# Patient Record
Sex: FEMALE | Race: WHITE | Employment: FULL TIME | URBAN - METROPOLITAN AREA
[De-identification: names, ages, dates, MRNs, and addresses within clinical notes are randomized per-mention and may not be internally consistent; named-entity substitution may affect disease eponyms.]

---

## 2018-08-16 ENCOUNTER — APPOINTMENT (OUTPATIENT)
Dept: ULTRASOUND IMAGING | Age: 33
End: 2018-08-16
Payer: MEDICAID

## 2018-08-16 ENCOUNTER — HOSPITAL ENCOUNTER (EMERGENCY)
Age: 33
Discharge: HOME | End: 2018-08-16
Payer: MEDICAID

## 2018-08-16 VITALS
BODY MASS INDEX: 41.95 KG/M2 | HEIGHT: 70 IN | HEART RATE: 90 BPM | WEIGHT: 293 LBS | OXYGEN SATURATION: 97 % | SYSTOLIC BLOOD PRESSURE: 145 MMHG | RESPIRATION RATE: 14 BRPM | TEMPERATURE: 98.9 F | DIASTOLIC BLOOD PRESSURE: 94 MMHG

## 2018-08-16 DIAGNOSIS — M79.89 LEG SWELLING: Primary | ICD-10-CM

## 2018-08-16 PROCEDURE — 99283 EMERGENCY DEPT VISIT LOW MDM: CPT

## 2018-08-16 PROCEDURE — 93971 EXTREMITY STUDY: CPT

## 2018-08-16 RX ORDER — NAPROXEN 500 MG/1
500 TABLET ORAL 2 TIMES DAILY
Qty: 30 TABLET | Refills: 0 | Status: SHIPPED | OUTPATIENT
Start: 2018-08-16 | End: 2020-08-13

## 2018-08-16 RX ORDER — LISINOPRIL 10 MG/1
10 TABLET ORAL DAILY
COMMUNITY
End: 2019-11-11 | Stop reason: SDUPTHER

## 2018-08-16 RX ORDER — METOPROLOL TARTRATE 50 MG/1
50 TABLET, FILM COATED ORAL 2 TIMES DAILY
COMMUNITY
End: 2019-11-11 | Stop reason: SDUPTHER

## 2018-08-16 ASSESSMENT — PAIN SCALES - GENERAL: PAINLEVEL_OUTOF10: 1

## 2018-08-16 NOTE — ED PROVIDER NOTES
Independent MLP    HPI:  8/16/18,   Time: 1:23 PM         Zeke Brooks is a 29 y.o. female presenting to the ED for pain and swelling left lower leg, beginning 2 days ago. The complaint has been persistent, mild in severity, and worsened by walking. Patient states she's been going up and down the steps quite a bit doing some extra activity. She's noted some increased pain deep in her left lower leg. She states that she has a history varicose veins. She has been sleeping on the couch which is different for her as well. There was no fall or trauma. Patient feels like the lower leg is a little bit more swollen than usual. All of the pain is below her knee. She reports that isn't deep and aching type of pain. Reports the pain is a little bit worse with ambulation. No redness or rash reported. Denies prior history of DVT. Denies chest pain or shortness of breath. ROS:     Constitutional: Negative for fever and chills  HENT: Negative for ear pain, sore throat and sinus pressure  Eyes: Negative for pain, discharge and redness  Respiratory:  Negative for shortness of breath, cough and wheezing  Cardiovascular: Negative for CP, edema or palpitations  Gastrointestinal: Negative for nausea, vomiting, diarrhea and abdominal distention  Genitourinary: Negative for dysuria and frequency  Musculoskeletal:  See HPI  Skin: Negative for rash and wound  Neurological: Negative for weakness and headaches  Hematological: Negative for adenopathy    All other systems reviewed and are negative      --------------------------------------------- PAST HISTORY ---------------------------------------------  Past Medical History:  has no past medical history on file. Past Surgical History:  has no past surgical history on file. Social History:      Family History: family history is not on file. The patients home medications have been reviewed.     Allergies: Patient has no known from these. She could also just be overdoing it with the stairs and moving. I'm recommending anti-inflammatories for now. Follow up with her PCP if any persistent or ongoing symptoms. She is aware and agreeable to this plan    Counseling: The emergency provider has spoken with the patient and family member patient and mother and discussed todays results, in addition to providing specific details for the plan of care and counseling regarding the diagnosis and prognosis. Questions are answered at this time and they are agreeable with the plan.      --------------------------------- IMPRESSION AND DISPOSITION ---------------------------------    IMPRESSION  1.  Leg swelling        DISPOSITION  Disposition: Discharge to home  Patient condition is stable                   Hiro Dent PA-C  08/16/18 1530

## 2019-04-30 VITALS
HEART RATE: 91 BPM | BODY MASS INDEX: 41.95 KG/M2 | SYSTOLIC BLOOD PRESSURE: 126 MMHG | HEIGHT: 70 IN | WEIGHT: 293 LBS | DIASTOLIC BLOOD PRESSURE: 70 MMHG | OXYGEN SATURATION: 99 %

## 2019-04-30 RX ORDER — ALPRAZOLAM 0.25 MG/1
0.25 TABLET ORAL NIGHTLY PRN
COMMUNITY
End: 2020-11-18 | Stop reason: SDUPTHER

## 2019-04-30 RX ORDER — MEDROXYPROGESTERONE ACETATE 150 MG/ML
150 INJECTION, SUSPENSION INTRAMUSCULAR
COMMUNITY

## 2019-04-30 RX ORDER — PAROXETINE 10 MG/1
10 TABLET, FILM COATED ORAL EVERY MORNING
COMMUNITY
End: 2019-11-11

## 2019-05-13 PROBLEM — F34.1 DYSTHYMIA: Status: ACTIVE | Noted: 2019-05-13

## 2019-05-13 PROBLEM — I10 ESSENTIAL HYPERTENSION: Status: ACTIVE | Noted: 2019-05-13

## 2019-10-23 RX ORDER — LISINOPRIL 10 MG/1
TABLET ORAL
Qty: 30 TABLET | Refills: 0 | OUTPATIENT
Start: 2019-10-23

## 2019-11-01 ENCOUNTER — TELEPHONE (OUTPATIENT)
Dept: PRIMARY CARE CLINIC | Age: 34
End: 2019-11-01

## 2019-11-01 RX ORDER — LISINOPRIL 10 MG/1
10 TABLET ORAL DAILY
Qty: 30 TABLET | Refills: 3 | OUTPATIENT
Start: 2019-11-01

## 2019-11-11 ENCOUNTER — HOSPITAL ENCOUNTER (OUTPATIENT)
Age: 34
Discharge: HOME | End: 2019-11-13
Payer: MEDICAID

## 2019-11-11 ENCOUNTER — OFFICE VISIT (OUTPATIENT)
Dept: PRIMARY CARE CLINIC | Age: 34
End: 2019-11-11
Payer: MEDICAID

## 2019-11-11 VITALS
SYSTOLIC BLOOD PRESSURE: 130 MMHG | HEIGHT: 70 IN | BODY MASS INDEX: 41.95 KG/M2 | WEIGHT: 293 LBS | DIASTOLIC BLOOD PRESSURE: 88 MMHG | TEMPERATURE: 78 F | OXYGEN SATURATION: 96 %

## 2019-11-11 DIAGNOSIS — I10 ESSENTIAL HYPERTENSION: Primary | ICD-10-CM

## 2019-11-11 DIAGNOSIS — E66.09 OBESITY DUE TO EXCESS CALORIES, UNSPECIFIED CLASSIFICATION, UNSPECIFIED WHETHER SERIOUS COMORBIDITY PRESENT: ICD-10-CM

## 2019-11-11 DIAGNOSIS — I10 ESSENTIAL HYPERTENSION: ICD-10-CM

## 2019-11-11 DIAGNOSIS — Z23 NEED FOR INFLUENZA VACCINATION: ICD-10-CM

## 2019-11-11 PROCEDURE — 36415 COLL VENOUS BLD VENIPUNCTURE: CPT

## 2019-11-11 PROCEDURE — G8482 FLU IMMUNIZE ORDER/ADMIN: HCPCS | Performed by: INTERNAL MEDICINE

## 2019-11-11 PROCEDURE — 1036F TOBACCO NON-USER: CPT | Performed by: INTERNAL MEDICINE

## 2019-11-11 PROCEDURE — G8427 DOCREV CUR MEDS BY ELIG CLIN: HCPCS | Performed by: INTERNAL MEDICINE

## 2019-11-11 PROCEDURE — 90471 IMMUNIZATION ADMIN: CPT | Performed by: INTERNAL MEDICINE

## 2019-11-11 PROCEDURE — G8417 CALC BMI ABV UP PARAM F/U: HCPCS | Performed by: INTERNAL MEDICINE

## 2019-11-11 PROCEDURE — 80053 COMPREHEN METABOLIC PANEL: CPT

## 2019-11-11 PROCEDURE — 99213 OFFICE O/P EST LOW 20 MIN: CPT | Performed by: INTERNAL MEDICINE

## 2019-11-11 PROCEDURE — 90686 IIV4 VACC NO PRSV 0.5 ML IM: CPT | Performed by: INTERNAL MEDICINE

## 2019-11-11 RX ORDER — GABAPENTIN 300 MG/1
CAPSULE ORAL
Refills: 1 | COMMUNITY
Start: 2019-11-07 | End: 2020-11-18 | Stop reason: SDUPTHER

## 2019-11-11 RX ORDER — ESCITALOPRAM OXALATE 10 MG/1
TABLET ORAL
Refills: 1 | COMMUNITY
Start: 2019-11-07 | End: 2020-11-18 | Stop reason: SDUPTHER

## 2019-11-11 RX ORDER — LISINOPRIL 10 MG/1
10 TABLET ORAL DAILY
Qty: 30 TABLET | Refills: 3 | Status: SHIPPED
Start: 2019-11-11 | End: 2020-04-03

## 2019-11-11 RX ORDER — METOPROLOL TARTRATE 50 MG/1
50 TABLET, FILM COATED ORAL 2 TIMES DAILY
Qty: 30 TABLET | Refills: 5 | Status: SHIPPED | OUTPATIENT
Start: 2019-11-11 | End: 2019-12-05

## 2019-11-11 ASSESSMENT — ENCOUNTER SYMPTOMS
TROUBLE SWALLOWING: 0
FACIAL SWELLING: 0
EYE ITCHING: 0
STRIDOR: 0
COUGH: 1
SHORTNESS OF BREATH: 0
RHINORRHEA: 0
COLOR CHANGE: 0
DIARRHEA: 0
PHOTOPHOBIA: 0
VOMITING: 0
SORE THROAT: 0
CONSTIPATION: 0
WHEEZING: 0
EYE DISCHARGE: 0
ANAL BLEEDING: 0
NAUSEA: 0
EYE PAIN: 0
BLOOD IN STOOL: 0
ABDOMINAL PAIN: 0

## 2019-11-12 LAB
ALBUMIN SERPL-MCNC: 4.2 G/DL (ref 3.5–5.2)
ALP BLD-CCNC: 58 U/L (ref 35–104)
ALT SERPL-CCNC: 30 U/L (ref 0–32)
ANION GAP SERPL CALCULATED.3IONS-SCNC: 17 MMOL/L (ref 7–16)
AST SERPL-CCNC: 24 U/L (ref 0–31)
BILIRUB SERPL-MCNC: 0.2 MG/DL (ref 0–1.2)
BUN BLDV-MCNC: 17 MG/DL (ref 6–20)
CALCIUM SERPL-MCNC: 9.5 MG/DL (ref 8.6–10.2)
CHLORIDE BLD-SCNC: 102 MMOL/L (ref 98–107)
CO2: 22 MMOL/L (ref 22–29)
CREAT SERPL-MCNC: 0.7 MG/DL (ref 0.5–1)
GFR AFRICAN AMERICAN: >60
GFR NON-AFRICAN AMERICAN: >60 ML/MIN/1.73
GLUCOSE BLD-MCNC: 89 MG/DL (ref 74–99)
POTASSIUM SERPL-SCNC: 4.6 MMOL/L (ref 3.5–5)
SODIUM BLD-SCNC: 141 MMOL/L (ref 132–146)
TOTAL PROTEIN: 7.8 G/DL (ref 6.4–8.3)

## 2019-12-05 DIAGNOSIS — I10 ESSENTIAL HYPERTENSION: ICD-10-CM

## 2019-12-05 RX ORDER — METOPROLOL TARTRATE 50 MG/1
50 TABLET, FILM COATED ORAL 2 TIMES DAILY
Qty: 30 TABLET | Refills: 5 | Status: SHIPPED
Start: 2019-12-05 | End: 2020-04-09

## 2019-12-11 PROBLEM — Z23 NEED FOR INFLUENZA VACCINATION: Status: RESOLVED | Noted: 2019-11-11 | Resolved: 2019-12-11

## 2020-04-03 RX ORDER — LISINOPRIL 10 MG/1
TABLET ORAL
Qty: 30 TABLET | Refills: 2 | Status: SHIPPED
Start: 2020-04-03 | End: 2020-05-11 | Stop reason: SDUPTHER

## 2020-04-03 NOTE — TELEPHONE ENCOUNTER
Last Appointment:  11/11/2019  Future Appointments   Date Time Provider Rolando Mason   5/11/2020  2:00 PM Jude Dawson

## 2020-04-09 RX ORDER — METOPROLOL TARTRATE 50 MG/1
75 TABLET, FILM COATED ORAL 2 TIMES DAILY
Qty: 135 TABLET | Refills: 1 | Status: SHIPPED
Start: 2020-04-09 | End: 2020-05-11 | Stop reason: SDUPTHER

## 2020-04-23 PROBLEM — K04.7 DENTAL ABSCESS: Status: ACTIVE | Noted: 2020-04-23

## 2020-04-23 RX ORDER — PENICILLIN V POTASSIUM 500 MG/1
500 TABLET ORAL 4 TIMES DAILY
Qty: 40 TABLET | Refills: 0 | Status: SHIPPED | OUTPATIENT
Start: 2020-04-23 | End: 2020-05-03

## 2020-05-11 ENCOUNTER — VIRTUAL VISIT (OUTPATIENT)
Dept: PRIMARY CARE CLINIC | Age: 35
End: 2020-05-11
Payer: MEDICAID

## 2020-05-11 PROBLEM — L65.9 ALOPECIA: Status: ACTIVE | Noted: 2020-05-11

## 2020-05-11 PROBLEM — K04.7 DENTAL ABSCESS: Status: RESOLVED | Noted: 2020-04-23 | Resolved: 2020-05-11

## 2020-05-11 PROBLEM — Z13.220 SCREENING FOR CHOLESTEROL LEVEL: Status: ACTIVE | Noted: 2020-05-11

## 2020-05-11 PROBLEM — L70.0 ACNE VULGARIS: Status: ACTIVE | Noted: 2020-05-11

## 2020-05-11 PROBLEM — F32.A DEPRESSION: Status: ACTIVE | Noted: 2019-05-13

## 2020-05-11 PROCEDURE — 99214 OFFICE O/P EST MOD 30 MIN: CPT | Performed by: INTERNAL MEDICINE

## 2020-05-11 RX ORDER — LISINOPRIL 10 MG/1
10 TABLET ORAL DAILY
Qty: 30 TABLET | Refills: 5 | Status: SHIPPED
Start: 2020-05-11 | End: 2020-08-13 | Stop reason: SDUPTHER

## 2020-05-11 RX ORDER — METOPROLOL TARTRATE 50 MG/1
75 TABLET, FILM COATED ORAL 2 TIMES DAILY
Qty: 135 TABLET | Refills: 1 | Status: SHIPPED
Start: 2020-05-11 | End: 2020-08-13 | Stop reason: SDUPTHER

## 2020-05-11 NOTE — PROGRESS NOTES
TELEHEALTH EVALUATION -- Audio/Visual (During EALIJ-14 public health emergency)    HPI:    Pawel Coughlin (:  1985) has requested an audio/video evaluation for the following concern(s): Hypertension    Patient has a history of hypertension, morbid obesity, depression, she sees a psychiatrist for her depression. Biggest complaint right now is acne. She says that she has been breaking out all over her face. She is on Depo-Provera wonder if this might be contributing to it. She sees her gynecologist and has a Pap scheduled on May 22. She says she has not had any luck in trying to lose weight. She is tried diet and exercise. She also complains of hair loss mainly on her right temple. Review of Systems   Constitutional: Negative for appetite change, fatigue and unexpected weight change. HENT: Negative for congestion, ear pain, facial swelling, rhinorrhea, sore throat, tinnitus and trouble swallowing. Right temporal hair loss   Eyes: Negative for photophobia, pain, discharge, itching and visual disturbance. Respiratory: Negative for cough, shortness of breath, wheezing and stridor. Cardiovascular: Negative for chest pain, palpitations and leg swelling. Gastrointestinal: Negative for abdominal pain, anal bleeding, blood in stool, constipation, diarrhea, nausea and vomiting. Endocrine: Negative for cold intolerance, heat intolerance, polydipsia, polyphagia and polyuria. Genitourinary: Negative for difficulty urinating, dysuria, flank pain, frequency, hematuria and urgency. Musculoskeletal: Negative for arthralgias, gait problem, joint swelling and myalgias. Skin: Negative for color change, pallor and rash. Acne   Allergic/Immunologic: Negative for environmental allergies and food allergies. Neurological: Negative for dizziness, tremors, seizures, syncope, speech difficulty, weakness, light-headedness, numbness and headaches. Hematological: Negative for adenopathy. Does not bruise/bleed easily. Psychiatric/Behavioral: Negative for agitation, behavioral problems, confusion, sleep disturbance and suicidal ideas. The patient is not nervous/anxious. Prior to Visit Medications    Medication Sig Taking? Authorizing Provider   metoprolol tartrate (LOPRESSOR) 50 MG tablet Take 1.5 tablets by mouth 2 times daily Yes Sarita Villa,    lisinopril (PRINIVIL;ZESTRIL) 10 MG tablet Take 1 tablet by mouth daily Yes Sarita Villa DO   escitalopram (LEXAPRO) 10 MG tablet TK 1 T PO QD Yes Historical Provider, MD   gabapentin (NEURONTIN) 300 MG capsule TK 1 C PO QHS Yes Historical Provider, MD   ALPRAZolam (XANAX) 0.25 MG tablet Take 0.25 mg by mouth nightly as needed for Sleep. Yes Historical Provider, MD   medroxyPROGESTERone (DEPO-PROVERA) 150 MG/ML injection Inject 150 mg into the muscle every 3 months Yes Historical Provider, MD   naproxen (NAPROSYN) 500 MG tablet Take 1 tablet by mouth 2 times daily for 30 doses Yes Zackary Harrington PA-C       Social History     Tobacco Use    Smoking status: Never Smoker    Smokeless tobacco: Never Used   Substance Use Topics    Alcohol use: Not on file    Drug use: Not on file            PHYSICAL EXAMINATION:  [ INSTRUCTIONS:  \"[x]\" Indicates a positive item  \"[]\" Indicates a negative item  -- DELETE ALL ITEMS NOT EXAMINED]    Constitutional: [x] Appears well-developed and well-nourished [] No apparent distress      [x] Abnormal- obese  Mental status  [x] Alert and awake  [x] Oriented to person/place/time [x]Able to follow commands      Eyes:  EOM    [x]  Normal  [] Abnormal-  Sclera  [x]  Normal  [] Abnormal -         Discharge [x]  None visible  [] Abnormal -    HENT:   [x] Normocephalic, atraumatic.   [] Abnormal   [x] Mouth/Throat: Mucous membranes are moist.     External Ears [x] Normal  [] Abnormal-     Neck: [x] No visualized mass     Pulmonary/Chest: [x] Respiratory effort normal.  [x] No visualized signs of difficulty of a physical exam that could be done are the elements supported by direct observation. 2. I would evaluate the patient and recommend diagnostics and treatments based on my assessment. 3. If it was felt that the patient should be evaluated in clinic or an emergency room setting, then they would be directed there. 4. Our sessions are not being recorded and that personal health information is protected. 5. Our team would provide follow up care in person if/when the patient needs it. Patient does agree to proceed with telemedicine consultation. Patient's location: home address in [facility]  Physician  location other address in [facility] other people involved in call none        Time spent: Greater than Not billed by time    This visit was completed virtually using [email or web address]. me    --Ren Valerio DO on 5/12/2020 at 9:17 AM    An electronic signature was used to authenticate this note.

## 2020-05-11 NOTE — PATIENT INSTRUCTIONS
Return within the month for fasting blood work  Call the office with the name of a dermatologist that you can see and I will make the referral

## 2020-05-12 ASSESSMENT — ENCOUNTER SYMPTOMS
SHORTNESS OF BREATH: 0
EYE DISCHARGE: 0
TROUBLE SWALLOWING: 0
COUGH: 0
NAUSEA: 0
CONSTIPATION: 0
PHOTOPHOBIA: 0
ROS SKIN COMMENTS: ACNE
WHEEZING: 0
RHINORRHEA: 0
DIARRHEA: 0
ABDOMINAL PAIN: 0
ANAL BLEEDING: 0
VOMITING: 0
FACIAL SWELLING: 0
STRIDOR: 0
COLOR CHANGE: 0
EYE ITCHING: 0
BLOOD IN STOOL: 0
EYE PAIN: 0
SORE THROAT: 0

## 2020-06-08 ENCOUNTER — TELEPHONE (OUTPATIENT)
Dept: PRIMARY CARE CLINIC | Age: 35
End: 2020-06-08

## 2020-06-10 PROBLEM — Z13.220 SCREENING FOR CHOLESTEROL LEVEL: Status: RESOLVED | Noted: 2020-05-11 | Resolved: 2020-06-10

## 2020-06-23 ENCOUNTER — TELEPHONE (OUTPATIENT)
Dept: PRIMARY CARE CLINIC | Age: 35
End: 2020-06-23

## 2020-08-13 ENCOUNTER — OFFICE VISIT (OUTPATIENT)
Dept: PRIMARY CARE CLINIC | Age: 35
End: 2020-08-13
Payer: MEDICAID

## 2020-08-13 VITALS
DIASTOLIC BLOOD PRESSURE: 76 MMHG | TEMPERATURE: 97.2 F | BODY MASS INDEX: 41.95 KG/M2 | WEIGHT: 293 LBS | OXYGEN SATURATION: 97 % | SYSTOLIC BLOOD PRESSURE: 128 MMHG | HEIGHT: 70 IN | HEART RATE: 86 BPM

## 2020-08-13 PROBLEM — E01.0 THYROMEGALY: Status: ACTIVE | Noted: 2020-08-13

## 2020-08-13 PROBLEM — E78.49 OTHER HYPERLIPIDEMIA: Status: ACTIVE | Noted: 2020-08-13

## 2020-08-13 PROCEDURE — 1036F TOBACCO NON-USER: CPT | Performed by: INTERNAL MEDICINE

## 2020-08-13 PROCEDURE — 99213 OFFICE O/P EST LOW 20 MIN: CPT | Performed by: INTERNAL MEDICINE

## 2020-08-13 PROCEDURE — G8417 CALC BMI ABV UP PARAM F/U: HCPCS | Performed by: INTERNAL MEDICINE

## 2020-08-13 PROCEDURE — G8427 DOCREV CUR MEDS BY ELIG CLIN: HCPCS | Performed by: INTERNAL MEDICINE

## 2020-08-13 RX ORDER — DOXYCYCLINE HYCLATE 100 MG/1
100 CAPSULE ORAL 2 TIMES DAILY
COMMUNITY
End: 2020-12-16

## 2020-08-13 RX ORDER — LISINOPRIL 10 MG/1
10 TABLET ORAL DAILY
Qty: 30 TABLET | Refills: 5 | Status: SHIPPED
Start: 2020-08-13 | End: 2020-11-12 | Stop reason: SDUPTHER

## 2020-08-13 RX ORDER — METOPROLOL TARTRATE 50 MG/1
75 TABLET, FILM COATED ORAL 2 TIMES DAILY
Qty: 135 TABLET | Refills: 1 | Status: SHIPPED
Start: 2020-08-13 | End: 2020-11-12 | Stop reason: SDUPTHER

## 2020-08-13 ASSESSMENT — ENCOUNTER SYMPTOMS
SORE THROAT: 0
COLOR CHANGE: 0
EYE PAIN: 0
PHOTOPHOBIA: 0
SHORTNESS OF BREATH: 0
WHEEZING: 0
EYE DISCHARGE: 0
BLOOD IN STOOL: 0
FACIAL SWELLING: 0
DIARRHEA: 0
ANAL BLEEDING: 0
CONSTIPATION: 0
STRIDOR: 0
ABDOMINAL PAIN: 0
EYE ITCHING: 0
RHINORRHEA: 0
TROUBLE SWALLOWING: 0
VOMITING: 0
COUGH: 0
NAUSEA: 0

## 2020-08-13 NOTE — PROGRESS NOTES
2020    Name: Chantal Jasmine : 1985 Sex: female  Age: 30 y.o. Subjective:  Chief Complaint   Patient presents with    Hypertension        Hypertension   Pertinent negatives include no chest pain, headaches, palpitations or shortness of breath. She  has a history of hypertension, depression and anxiety. She is on metoprolol 50 mg twice daily and lisinopril 10 mg daily. Does not check her blood pressures at home. She sees Dr. Darlin Jimenez for depression, anxiety and restless arms. He has her on gabapentin escitalopram and alprazolam.      She gets a Depo-Provera injections from her gynecologist Dr. Rene Hall in [place]. She sees him for her Paps and pelvics. She is morbidly obese    She recently saw Dr Harmony Bryson for her acne. He started her on doxycycline 100 mg twice a day. .. Review of Systems   Constitutional: Negative for appetite change, fatigue and unexpected weight change. HENT: Negative for congestion, ear pain, facial swelling, rhinorrhea, sore throat, tinnitus and trouble swallowing. Eyes: Negative for photophobia, pain, discharge, itching and visual disturbance. Respiratory: Negative for cough, shortness of breath, wheezing and stridor. Cardiovascular: Negative for chest pain, palpitations and leg swelling. Gastrointestinal: Negative for abdominal pain, anal bleeding, blood in stool, constipation, diarrhea, nausea and vomiting. Endocrine: Negative for cold intolerance, heat intolerance, polydipsia, polyphagia and polyuria. Genitourinary: Negative for difficulty urinating, dysuria, flank pain, frequency, hematuria and urgency. Musculoskeletal: Negative for arthralgias, gait problem, joint swelling and myalgias. Skin: Negative for color change, pallor and rash. Allergic/Immunologic: Negative for environmental allergies and food allergies.    Neurological: Negative for dizziness, tremors, seizures, syncope, speech difficulty, weakness, light-headedness, numbness and headaches. Hematological: Negative for adenopathy. Does not bruise/bleed easily. Psychiatric/Behavioral: Negative for agitation, behavioral problems, confusion, sleep disturbance and suicidal ideas. The patient is not nervous/anxious.            Current Outpatient Medications:     metoprolol tartrate (LOPRESSOR) 50 MG tablet, Take 1.5 tablets by mouth 2 times daily, Disp: 135 tablet, Rfl: 1    lisinopril (PRINIVIL;ZESTRIL) 10 MG tablet, Take 1 tablet by mouth daily, Disp: 30 tablet, Rfl: 5    doxycycline hyclate (VIBRAMYCIN) 100 MG capsule, Take 100 mg by mouth 2 times daily, Disp: , Rfl:     escitalopram (LEXAPRO) 10 MG tablet, TK 1 T PO QD, Disp: , Rfl: 1    gabapentin (NEURONTIN) 300 MG capsule, TK 1 C PO QHS, Disp: , Rfl: 1    ALPRAZolam (XANAX) 0.25 MG tablet, Take 0.25 mg by mouth nightly as needed for Sleep., Disp: , Rfl:     medroxyPROGESTERone (DEPO-PROVERA) 150 MG/ML injection, Inject 150 mg into the muscle every 3 months, Disp: , Rfl:      Allergies   Allergen Reactions    No Known Allergies         Past Medical History:   Diagnosis Date    Dental abscess 4/23/2020       Health Maintenance Due   Topic Date Due    Varicella vaccine (1 of 2 - 2-dose childhood series) 11/24/1988    HIV screen  11/24/2002    DTaP/Tdap/Td vaccine (1 - Tdap) 11/24/2006    Cervical cancer screen  11/24/2008        Patient Active Problem List   Diagnosis    Essential hypertension    Depression    Morbid obesity (HCC)    Acne vulgaris    Alopecia    Thyromegaly    Other hyperlipidemia        Past Surgical History:   Procedure Laterality Date    TYMPANOSTOMY TUBE PLACEMENT          Family History   Problem Relation Age of Onset    High Blood Pressure Father     Diabetes Father         Social History     Tobacco Use    Smoking status: Never Smoker    Smokeless tobacco: Never Used   Substance Use Topics    Alcohol use: Not on file    Drug use: Not on file        Objective  Vitals:    08/13/20 1324 BP: 128/76   Site: Right Upper Arm   Position: Sitting   Cuff Size: Medium Adult   Pulse: 86   Temp: 97.2 °F (36.2 °C)   TempSrc: Temporal   SpO2: 97%   Weight: (!) 353 lb (160.1 kg)   Height: 5' 10\" (1.778 m)        Exam:  Physical Exam  Vitals signs reviewed. Constitutional:       General: She is not in acute distress. Appearance: She is well-developed. She is obese. She is not ill-appearing. HENT:      Head: Normocephalic. Right Ear: External ear normal.      Left Ear: External ear normal.   Eyes:      General: No scleral icterus. Right eye: No discharge. Left eye: No discharge. Conjunctiva/sclera: Conjunctivae normal.      Pupils: Pupils are equal, round, and reactive to light. Neck:      Musculoskeletal: Normal range of motion and neck supple. Thyroid: No thyromegaly. Comments: Thyroid slightly enlarged  Cardiovascular:      Rate and Rhythm: Normal rate and regular rhythm. Heart sounds: Normal heart sounds. No murmur. No friction rub. No gallop. Pulmonary:      Effort: Pulmonary effort is normal. No respiratory distress. Breath sounds: Normal breath sounds. No wheezing or rales. Chest:      Chest wall: No tenderness. Abdominal:      General: Bowel sounds are normal. There is no distension. Palpations: Abdomen is soft. There is no mass. Tenderness: There is no abdominal tenderness. There is no guarding or rebound. Musculoskeletal: Normal range of motion. General: No tenderness or deformity. Lymphadenopathy:      Cervical: No cervical adenopathy. Skin:     General: Skin is warm and dry. Coloration: Skin is not pale. Findings: No erythema or rash. Comments: Few acneiform lesions on her face   Neurological:      Mental Status: She is alert and oriented to person, place, and time. Cranial Nerves: No cranial nerve deficit. Sensory: No sensory deficit.       Deep Tendon Reflexes: Reflexes normal. Psychiatric:         Mood and Affect: Mood normal.         Behavior: Behavior normal.         Thought Content: Thought content normal.         Judgment: Judgment normal.          Last labs reviewed. ASSESSMENT & PLAN :   Problem List        Circulatory    Essential hypertension - Primary     Blood pressures are stable. Continue medications and monitor blood pressures at home. Call office if systolics are over 133 and diastolics over 90. Relevant Medications    metoprolol tartrate (LOPRESSOR) 50 MG tablet    lisinopril (PRINIVIL;ZESTRIL) 10 MG tablet    Other Relevant Orders    Comprehensive Metabolic Panel       Endocrine    Thyromegaly     Check TSH         Relevant Orders    TSH without Reflex       Musculoskeletal and Integument    Acne vulgaris     Continue doxycycline and follow-up with her dermatologist         Relevant Medications    doxycycline hyclate (VIBRAMYCIN) 100 MG capsule       Other    Depression     Continue Lexapro, gabapentin and alprazolam per her psychiatrist         Relevant Medications    ALPRAZolam (XANAX) 0.25 MG tablet    escitalopram (LEXAPRO) 10 MG tablet    Morbid obesity ([address]     Encourage patient to try to lose some weight         Other hyperlipidemia     Return for fasting lipid profile         Relevant Medications    metoprolol tartrate (LOPRESSOR) 50 MG tablet    lisinopril (PRINIVIL;ZESTRIL) 10 MG tablet           Return in about 4 months (around 12/13/2020).        Sujit Yesenia, DO  8/13/2020

## 2020-11-04 ENCOUNTER — TELEPHONE (OUTPATIENT)
Dept: PRIMARY CARE CLINIC | Age: 35
End: 2020-11-04

## 2020-11-04 NOTE — TELEPHONE ENCOUNTER
Called patient at her request.  She has been having problems with maxillary pain, she put on some yellow-green mucus. She is fatigued does not feel good. Low-grade fever, cough productive of yellowish phlegm. No chills or diaphoresis. No allergies non-smoker. Medications reviewed, no changes  Past medical family social and surgical history reviewed without any changes  Systems review negative except for above    Assessment: Acute sinusitis    Plan:  Augmentin 875 twice daily sent to [facility] take for 10 days. Take a probiotic while on antibiotic.   If no better see me next week

## 2020-11-12 RX ORDER — LISINOPRIL 10 MG/1
10 TABLET ORAL DAILY
Qty: 30 TABLET | Refills: 5 | Status: SHIPPED
Start: 2020-11-12 | End: 2021-06-23 | Stop reason: SDUPTHER

## 2020-11-12 RX ORDER — METOPROLOL TARTRATE 50 MG/1
75 TABLET, FILM COATED ORAL 2 TIMES DAILY
Qty: 135 TABLET | Refills: 1 | Status: SHIPPED
Start: 2020-11-12 | End: 2020-12-16 | Stop reason: SDUPTHER

## 2020-11-18 RX ORDER — ESCITALOPRAM OXALATE 10 MG/1
TABLET ORAL
Qty: 30 TABLET | Refills: 0 | Status: SHIPPED
Start: 2020-11-18 | End: 2020-12-14

## 2020-11-18 RX ORDER — GABAPENTIN 300 MG/1
CAPSULE ORAL
Qty: 60 CAPSULE | Refills: 0 | Status: SHIPPED
Start: 2020-11-18 | End: 2020-12-14

## 2020-11-18 RX ORDER — ALPRAZOLAM 0.25 MG/1
0.25 TABLET ORAL NIGHTLY PRN
Qty: 30 TABLET | Refills: 0 | Status: SHIPPED
Start: 2020-11-18 | End: 2020-12-16 | Stop reason: SDUPTHER

## 2020-11-18 NOTE — PROGRESS NOTES
Received a call from patient that she is transferring to another physician but unable to see her till January 17, 2021. She requests that I order prescriptions for her till that date.   She needs prescriptions for gabapentin, Lexapro and alprazolam.  It looks like these were all ordered by her psychiatrist.  Gabapentin last prescription was 60 pills on 10/18/2020, alprazolam 0.25 is 30 pills on 9/16/2020

## 2020-12-14 ENCOUNTER — OFFICE VISIT (OUTPATIENT)
Dept: FAMILY MEDICINE CLINIC | Age: 35
End: 2020-12-14
Payer: MEDICAID

## 2020-12-14 VITALS
BODY MASS INDEX: 50.65 KG/M2 | WEIGHT: 293 LBS | SYSTOLIC BLOOD PRESSURE: 115 MMHG | HEART RATE: 91 BPM | DIASTOLIC BLOOD PRESSURE: 70 MMHG | TEMPERATURE: 97.4 F | OXYGEN SATURATION: 98 %

## 2020-12-14 PROCEDURE — G8417 CALC BMI ABV UP PARAM F/U: HCPCS | Performed by: PHYSICIAN ASSISTANT

## 2020-12-14 PROCEDURE — G8484 FLU IMMUNIZE NO ADMIN: HCPCS | Performed by: PHYSICIAN ASSISTANT

## 2020-12-14 PROCEDURE — 1036F TOBACCO NON-USER: CPT | Performed by: PHYSICIAN ASSISTANT

## 2020-12-14 PROCEDURE — G8427 DOCREV CUR MEDS BY ELIG CLIN: HCPCS | Performed by: PHYSICIAN ASSISTANT

## 2020-12-14 PROCEDURE — 99213 OFFICE O/P EST LOW 20 MIN: CPT | Performed by: PHYSICIAN ASSISTANT

## 2020-12-14 RX ORDER — ESCITALOPRAM OXALATE 10 MG/1
TABLET ORAL
Qty: 30 TABLET | Refills: 0 | Status: SHIPPED
Start: 2020-12-14 | End: 2021-01-11

## 2020-12-14 RX ORDER — CEFDINIR 300 MG/1
300 CAPSULE ORAL 2 TIMES DAILY
Qty: 20 CAPSULE | Refills: 0 | Status: SHIPPED | OUTPATIENT
Start: 2020-12-14 | End: 2020-12-24

## 2020-12-14 RX ORDER — GABAPENTIN 300 MG/1
CAPSULE ORAL
Qty: 60 CAPSULE | Refills: 0 | Status: SHIPPED
Start: 2020-12-14 | End: 2021-01-11

## 2020-12-14 RX ORDER — FLUCONAZOLE 150 MG/1
150 TABLET ORAL ONCE
Qty: 1 TABLET | Refills: 0 | Status: SHIPPED | OUTPATIENT
Start: 2020-12-14 | End: 2020-12-14

## 2020-12-14 NOTE — PROGRESS NOTES
Weight: (!) 353 lb (160.1 kg)        Exam:  Const: Appears healthy and well developed. Vital reviewed as per triage. No acute distress. Head/Face: Normocephalic, atraumatic. Facies is symmetric. Eyes: Pupils equal, round and reactive to light. ENMT: Left external canal is without inflammation or occlusion. Right external canal is without inflammation or occlusion. No pain with movement of auricles. Left tympanic membrane is pearly gray with good light reflex. Right tympanic membrane is erythematous. No mastoid tenderness noted bilaterally. Nares are patent. Buccal mucosa is moist.  No erythema in the posterior pharynx. Resp: Clear to auscultation bilaterally. CV: Rhythm is regular. S1 is normal. S2 is normal.  Lymph: No visible or palpable cervical lymphadenopathy. Submandibular nodes not palpable. No meningeal signs. Skin: Skin is warm and dry. Neuro: Alert and oriented x3. Speech is articulate and fluent. Psych: Mood and affect are appropriate to situation. Maylin Mack was seen today for otalgia. Diagnoses and all orders for this visit:    Right otitis media, unspecified otitis media type  -     cefdinir (OMNICEF) 300 MG capsule; Take 1 capsule by mouth 2 times daily for 10 days    Other orders  -     fluconazole (DIFLUCAN) 150 MG tablet; Take 1 tablet by mouth once for 1 dose        Patient is to follow-up with their PCP in the next 5-7 days. If there are any new or worsening symptoms to the emergency department.        Seen By:    Bob Moore PA-C

## 2020-12-16 ENCOUNTER — OFFICE VISIT (OUTPATIENT)
Dept: PRIMARY CARE CLINIC | Age: 35
End: 2020-12-16
Payer: MEDICAID

## 2020-12-16 VITALS
OXYGEN SATURATION: 97 % | TEMPERATURE: 97.2 F | DIASTOLIC BLOOD PRESSURE: 78 MMHG | BODY MASS INDEX: 50.65 KG/M2 | HEART RATE: 72 BPM | HEIGHT: 70 IN | SYSTOLIC BLOOD PRESSURE: 136 MMHG

## 2020-12-16 PROBLEM — F41.9 ANXIETY: Status: ACTIVE | Noted: 2020-12-16

## 2020-12-16 PROCEDURE — 99213 OFFICE O/P EST LOW 20 MIN: CPT | Performed by: INTERNAL MEDICINE

## 2020-12-16 PROCEDURE — G8417 CALC BMI ABV UP PARAM F/U: HCPCS | Performed by: INTERNAL MEDICINE

## 2020-12-16 PROCEDURE — G8427 DOCREV CUR MEDS BY ELIG CLIN: HCPCS | Performed by: INTERNAL MEDICINE

## 2020-12-16 PROCEDURE — 1036F TOBACCO NON-USER: CPT | Performed by: INTERNAL MEDICINE

## 2020-12-16 PROCEDURE — G8484 FLU IMMUNIZE NO ADMIN: HCPCS | Performed by: INTERNAL MEDICINE

## 2020-12-16 RX ORDER — ALPRAZOLAM 0.25 MG/1
0.25 TABLET ORAL NIGHTLY PRN
Qty: 30 TABLET | Refills: 0 | Status: SHIPPED | OUTPATIENT
Start: 2020-12-16 | End: 2021-01-15

## 2020-12-16 RX ORDER — METOPROLOL TARTRATE 50 MG/1
75 TABLET, FILM COATED ORAL 2 TIMES DAILY
Qty: 135 TABLET | Refills: 1 | Status: SHIPPED
Start: 2020-12-16 | End: 2021-05-14 | Stop reason: SDUPTHER

## 2020-12-16 ASSESSMENT — ENCOUNTER SYMPTOMS
DIARRHEA: 0
SORE THROAT: 0
EYE DISCHARGE: 0
EYE ITCHING: 0
SHORTNESS OF BREATH: 0
COUGH: 0
BLOOD IN STOOL: 0
CONSTIPATION: 0
FACIAL SWELLING: 0
PHOTOPHOBIA: 0
VOMITING: 0
COLOR CHANGE: 0
ANAL BLEEDING: 0
RHINORRHEA: 0
WHEEZING: 0
ROS SKIN COMMENTS: ACNE
ABDOMINAL PAIN: 0
TROUBLE SWALLOWING: 0
EYE PAIN: 0
NAUSEA: 0
STRIDOR: 0

## 2020-12-16 NOTE — PROGRESS NOTES
2020    Name: Xochitl Lee : 1985 Sex: female  Age: 37 y.o. Subjective:  Chief Complaint   Patient presents with    Hypertension        Hypertension  Pertinent negatives include no chest pain, headaches, palpitations or shortness of breath. She  has a history of hypertension, and anxiety. She is on metoprolol 50 mg twice daily and lisinopril 10 mg daily. Does not check her blood pressures at home. She will be seeing a new psychiatrist in [place] [address]. She requests a 1 month supply of alprazolam and escitalopram until she gets to see him in January. Reviewed her OARRS and her use is appropriate. We will go ahead and fill that for her for this month. She gets a Depo-Provera injections from her gynecologist Dr. Melissa Contreras in [place]. She sees him for her Paps and pelvics. She is morbidly obese    She recently saw Dr Amira Fowler for her acne. He started her on doxycycline 100 mg twice a day. ... She is off of this now. This did not really work so she is going back to see him soon. She was seen in [address] [place] [address] recently and is being treated for an earache with cefdinir. She will be coming in in the future for a flu shot and her fasting blood work    Review of Systems   Constitutional: Negative for appetite change, fatigue and unexpected weight change. HENT: Negative for congestion, ear pain, facial swelling, rhinorrhea, sore throat, tinnitus and trouble swallowing. Eyes: Negative for photophobia, pain, discharge, itching and visual disturbance. Respiratory: Negative for cough, shortness of breath, wheezing and stridor. Cardiovascular: Negative for chest pain, palpitations and leg swelling. Gastrointestinal: Negative for abdominal pain, anal bleeding, blood in stool, constipation, diarrhea, nausea and vomiting. Endocrine: Negative for cold intolerance, heat intolerance, polydipsia, polyphagia and polyuria.    Genitourinary: Negative for difficulty urinating, dysuria, flank pain, frequency, hematuria and urgency. Musculoskeletal: Negative for arthralgias, gait problem, joint swelling and myalgias. Skin: Negative for color change, pallor and rash. acne   Allergic/Immunologic: Negative for environmental allergies and food allergies. Neurological: Negative for dizziness, tremors, seizures, syncope, speech difficulty, weakness, light-headedness, numbness and headaches. Hematological: Negative for adenopathy. Does not bruise/bleed easily. Psychiatric/Behavioral: Negative for agitation, behavioral problems, confusion, sleep disturbance and suicidal ideas. The patient is not nervous/anxious. Current Outpatient Medications:     ALPRAZolam (XANAX) 0.25 MG tablet, Take 1 tablet by mouth nightly as needed for Sleep for up to 30 days. , Disp: 30 tablet, Rfl: 0    metoprolol tartrate (LOPRESSOR) 50 MG tablet, Take 1.5 tablets by mouth 2 times daily, Disp: 135 tablet, Rfl: 1    escitalopram (LEXAPRO) 10 MG tablet, TAKE 1 TABLET BY MOUTH DAILY, Disp: 30 tablet, Rfl: 0    gabapentin (NEURONTIN) 300 MG capsule, TAKE 1 CAPSULE BY MOUTH TWICE DAILY, Disp: 60 capsule, Rfl: 0    cefdinir (OMNICEF) 300 MG capsule, Take 1 capsule by mouth 2 times daily for 10 days, Disp: 20 capsule, Rfl: 0    medroxyPROGESTERone (DEPO-PROVERA) 150 MG/ML injection, Inject 150 mg into the muscle every 3 months, Disp: , Rfl:     lisinopril (PRINIVIL;ZESTRIL) 10 MG tablet, Take 1 tablet by mouth daily, Disp: 30 tablet, Rfl: 5     Allergies   Allergen Reactions    No Known Allergies         Past Medical History:   Diagnosis Date    Dental abscess 4/23/2020       Health Maintenance Due   Topic Date Due    Varicella vaccine (1 of 2 - 2-dose childhood series) 11/24/1988    HIV screen  11/24/2002    Cervical cancer screen  11/24/2008    Flu vaccine (1) 09/01/2020    Potassium monitoring  11/11/2020    Creatinine monitoring  11/11/2020        Patient Active Problem List   Diagnosis    Essential hypertension    Flu vaccine need    Acne vulgaris    Alopecia    Thyromegaly    Other hyperlipidemia    Anxiety        Past Surgical History:   Procedure Laterality Date    TYMPANOSTOMY TUBE PLACEMENT          Family History   Problem Relation Age of Onset    High Blood Pressure Father     Diabetes Father         Social History     Tobacco Use    Smoking status: Never Smoker    Smokeless tobacco: Never Used   Substance Use Topics    Alcohol use: Not on file    Drug use: Not on file        Objective  Vitals:    12/16/20 1353   BP: 136/78   Site: Right Upper Arm   Position: Sitting   Cuff Size: Medium Adult   Pulse: 72   Temp: 97.2 °F (36.2 °C)   TempSrc: Temporal   SpO2: 97%   Weight: Comment: refuse   Height: 5' 10\" (1.778 m)        Exam:  Physical Exam  Vitals signs reviewed. Constitutional:       General: She is not in acute distress. Appearance: She is well-developed. She is obese. She is not ill-appearing. HENT:      Head: Normocephalic. Right Ear: External ear normal.      Left Ear: External ear normal.   Eyes:      General: No scleral icterus. Right eye: No discharge. Left eye: No discharge. Conjunctiva/sclera: Conjunctivae normal.      Pupils: Pupils are equal, round, and reactive to light. Neck:      Musculoskeletal: Normal range of motion and neck supple. Thyroid: No thyromegaly. Comments: Thyroid slightly enlarged  Cardiovascular:      Rate and Rhythm: Normal rate and regular rhythm. Heart sounds: Normal heart sounds. No murmur. No friction rub. No gallop. Pulmonary:      Effort: Pulmonary effort is normal. No respiratory distress. Breath sounds: Normal breath sounds. No wheezing or rales. Chest:      Chest wall: No tenderness. Abdominal:      General: Bowel sounds are normal. There is no distension. Palpations: Abdomen is soft. There is no mass. Tenderness: There is no abdominal tenderness.  There is no guarding or rebound. Musculoskeletal: Normal range of motion. General: No tenderness or deformity. Lymphadenopathy:      Cervical: No cervical adenopathy. Skin:     General: Skin is warm and dry. Coloration: Skin is not pale. Findings: No erythema or rash. Comments: Few acneiform lesions on her face   Neurological:      Mental Status: She is alert and oriented to person, place, and time. Cranial Nerves: No cranial nerve deficit. Sensory: No sensory deficit. Deep Tendon Reflexes: Reflexes normal.   Psychiatric:         Mood and Affect: Mood normal.         Behavior: Behavior normal.         Thought Content: Thought content normal.         Judgment: Judgment normal.          Last labs reviewed. ASSESSMENT & PLAN :   Problem List        Circulatory    Essential hypertension - Primary     Blood pressures are stable. Continue medications and monitor blood pressures at home. Call office if systolics are over 527 over diastolics over 90. Relevant Medications    metoprolol tartrate (LOPRESSOR) 50 MG tablet    Other Relevant Orders    Comprehensive Metabolic Panel       Endocrine    Thyromegaly     Check TSH         Relevant Orders    TSH without Reflex       Musculoskeletal and Integument    Acne vulgaris     Follow-up with dermatologist            Other    Flu vaccine need     Return for flu vaccine when she gets her  blood work         Relevant Orders    INFLUENZA, QUADV, 3 YRS AND OLDER, IM PF, PREFILL SYR OR SDV, 0.5ML (AFLURIA QUADV, PF)    Other hyperlipidemia     Check fasting lipid profile         Relevant Medications    metoprolol tartrate (LOPRESSOR) 50 MG tablet    Other Relevant Orders    Lipid Panel    Anxiety     1 month prescription for alprazolam given. Follow-up with her psychiatrist next month         Relevant Medications    escitalopram (LEXAPRO) 10 MG tablet    ALPRAZolam (XANAX) 0.25 MG tablet           Return today (on 12/16/2020), or hypertension.

## 2020-12-16 NOTE — ASSESSMENT & PLAN NOTE
Blood pressures are stable. Continue medications and monitor blood pressures at home. Call office if systolics are over 913 over diastolics over 90.

## 2021-01-11 DIAGNOSIS — F32.89 OTHER DEPRESSION: ICD-10-CM

## 2021-01-11 RX ORDER — GABAPENTIN 300 MG/1
CAPSULE ORAL
Qty: 60 CAPSULE | Refills: 5 | Status: SHIPPED | OUTPATIENT
Start: 2021-01-11 | End: 2022-06-22

## 2021-01-11 RX ORDER — ESCITALOPRAM OXALATE 10 MG/1
TABLET ORAL
Qty: 30 TABLET | Refills: 0 | Status: SHIPPED
Start: 2021-01-11 | End: 2021-02-15

## 2021-01-11 NOTE — TELEPHONE ENCOUNTER
Last Appointment:  12/16/2020  Future Appointments   Date Time Provider Rolando Mason   3/18/2021  1:30  S Brea Breen

## 2021-02-15 DIAGNOSIS — F32.89 OTHER DEPRESSION: ICD-10-CM

## 2021-02-15 RX ORDER — ESCITALOPRAM OXALATE 10 MG/1
TABLET ORAL
Qty: 30 TABLET | Refills: 0 | Status: SHIPPED
Start: 2021-02-15 | End: 2021-09-08 | Stop reason: ALTCHOICE

## 2021-03-08 DIAGNOSIS — F51.01 PRIMARY INSOMNIA: Primary | ICD-10-CM

## 2021-03-08 DIAGNOSIS — F41.9 ANXIETY: ICD-10-CM

## 2021-03-08 RX ORDER — ALPRAZOLAM 0.25 MG/1
0.25 TABLET ORAL NIGHTLY PRN
Qty: 38 TABLET | Refills: 0 | Status: SHIPPED | OUTPATIENT
Start: 2021-03-08 | End: 2021-04-15

## 2021-03-12 ENCOUNTER — OFFICE VISIT (OUTPATIENT)
Dept: FAMILY MEDICINE CLINIC | Age: 36
End: 2021-03-12
Payer: MEDICAID

## 2021-03-12 VITALS
WEIGHT: 293 LBS | OXYGEN SATURATION: 98 % | DIASTOLIC BLOOD PRESSURE: 84 MMHG | HEART RATE: 90 BPM | BODY MASS INDEX: 41.95 KG/M2 | HEIGHT: 70 IN | SYSTOLIC BLOOD PRESSURE: 128 MMHG | TEMPERATURE: 97.2 F

## 2021-03-12 DIAGNOSIS — R09.81 SINUS CONGESTION: ICD-10-CM

## 2021-03-12 DIAGNOSIS — R05.9 COUGH: ICD-10-CM

## 2021-03-12 DIAGNOSIS — H61.23 BILATERAL IMPACTED CERUMEN: ICD-10-CM

## 2021-03-12 DIAGNOSIS — H66.92 LEFT OTITIS MEDIA, UNSPECIFIED OTITIS MEDIA TYPE: ICD-10-CM

## 2021-03-12 DIAGNOSIS — J06.9 UPPER RESPIRATORY TRACT INFECTION, UNSPECIFIED TYPE: Primary | ICD-10-CM

## 2021-03-12 PROCEDURE — 1036F TOBACCO NON-USER: CPT | Performed by: PHYSICIAN ASSISTANT

## 2021-03-12 PROCEDURE — 99213 OFFICE O/P EST LOW 20 MIN: CPT | Performed by: PHYSICIAN ASSISTANT

## 2021-03-12 PROCEDURE — G8427 DOCREV CUR MEDS BY ELIG CLIN: HCPCS | Performed by: PHYSICIAN ASSISTANT

## 2021-03-12 PROCEDURE — G8417 CALC BMI ABV UP PARAM F/U: HCPCS | Performed by: PHYSICIAN ASSISTANT

## 2021-03-12 PROCEDURE — G8484 FLU IMMUNIZE NO ADMIN: HCPCS | Performed by: PHYSICIAN ASSISTANT

## 2021-03-12 RX ORDER — AMOXICILLIN AND CLAVULANATE POTASSIUM 875; 125 MG/1; MG/1
1 TABLET, FILM COATED ORAL 2 TIMES DAILY
Qty: 20 TABLET | Refills: 0 | Status: SHIPPED | OUTPATIENT
Start: 2021-03-12 | End: 2021-03-22

## 2021-03-12 RX ORDER — BROMPHENIRAMINE MALEATE, PSEUDOEPHEDRINE HYDROCHLORIDE, AND DEXTROMETHORPHAN HYDROBROMIDE 2; 30; 10 MG/5ML; MG/5ML; MG/5ML
5 SYRUP ORAL 4 TIMES DAILY PRN
Qty: 120 ML | Refills: 0 | Status: SHIPPED | OUTPATIENT
Start: 2021-03-12 | End: 2021-03-19

## 2021-03-12 NOTE — PROGRESS NOTES
needed for Congestion or Cough, Disp: 120 mL, Rfl: 0    amoxicillin-clavulanate (AUGMENTIN) 875-125 MG per tablet, Take 1 tablet by mouth 2 times daily for 10 days, Disp: 20 tablet, Rfl: 0    ALPRAZolam (XANAX) 0.25 MG tablet, Take 1 tablet by mouth nightly as needed for Sleep or Anxiety for up to 38 days. , Disp: 38 tablet, Rfl: 0    escitalopram (LEXAPRO) 10 MG tablet, TAKE 1 TABLET BY MOUTH DAILY, Disp: 30 tablet, Rfl: 0    gabapentin (NEURONTIN) 300 MG capsule, TAKE 1 CAPSULE BY MOUTH TWICE DAILY, Disp: 60 capsule, Rfl: 5    metoprolol tartrate (LOPRESSOR) 50 MG tablet, Take 1.5 tablets by mouth 2 times daily, Disp: 135 tablet, Rfl: 1    lisinopril (PRINIVIL;ZESTRIL) 10 MG tablet, Take 1 tablet by mouth daily, Disp: 30 tablet, Rfl: 5    medroxyPROGESTERone (DEPO-PROVERA) 150 MG/ML injection, Inject 150 mg into the muscle every 3 months, Disp: , Rfl:    Allergies   Allergen Reactions    No Known Allergies         Objective:  Vitals:    03/12/21 1212   BP: 128/84   Pulse: 90   Temp: 97.2 °F (36.2 °C)   SpO2: 98%   Weight: (!) 367 lb (166.5 kg)   Height: 5' 10\" (1.778 m)        Exam:  Const: Appears healthy and well developed. No signs of acute distress present. Vitals reviewed per triage. Head/Face: Normocephalic, atraumatic. Facies is symmetric. Eyes: PERRL. ENMT: External canals initially occluded by cerumen. They were successfully lavaged. Left TM with slight erythema. Right TM is gray and intact. Canals are patent. Nares are patent. Buccal mucosa is moist.  No erythema in the posterior pharynx. Neck: Supple and symmetric. Palpation reveals no adenopathy. Trachea midline. Resp: Lungs are clear bilaterally. No adventitious sounds audible. CV: S1 is normal. S2 is normal.Pulses are equal bilaterally. Abdomen:  nondistended, soft and non tender  Musculo: Patient moves extremities without pain or limitation. No pedal edema. Skin: Skin is warm and dry. Neuro: Alert and oriented x3.  Speech is articulate and fluent. Psych: Patient's mood and affect is appropriate to situation. Quintin Kidd was seen today for head congestion. Diagnoses and all orders for this visit:    Upper respiratory tract infection, unspecified type  -     brompheniramine-pseudoephedrine-DM 2-30-10 MG/5ML syrup; Take 5 mLs by mouth 4 times daily as needed for Congestion or Cough    Cough  -     Covid-19 Ambulatory; Future    Sinus congestion  -     Covid-19 Ambulatory; Future    Left otitis media, unspecified otitis media type  -     amoxicillin-clavulanate (AUGMENTIN) 875-125 MG per tablet; Take 1 tablet by mouth 2 times daily for 10 days    Bilateral impacted cerumen  -     Ear wax removal          You have symptoms that are suggestive of an URI, possibly Covid. Covid test has been sent to lab and is pending. Please check your mychart for results. If you get any new or worsening symptoms, please go to the nearest emergency department. Otherwise, follow up with your PCP in 5-7 days.       Seen By:    Saeid Cui PA-C

## 2021-03-14 LAB
SARS-COV-2: NOT DETECTED
SOURCE: NORMAL

## 2021-03-18 ENCOUNTER — OFFICE VISIT (OUTPATIENT)
Dept: PRIMARY CARE CLINIC | Age: 36
End: 2021-03-18
Payer: MEDICAID

## 2021-03-18 VITALS
TEMPERATURE: 97.5 F | HEIGHT: 70 IN | BODY MASS INDEX: 41.95 KG/M2 | SYSTOLIC BLOOD PRESSURE: 130 MMHG | OXYGEN SATURATION: 98 % | WEIGHT: 293 LBS | RESPIRATION RATE: 20 BRPM | DIASTOLIC BLOOD PRESSURE: 72 MMHG | HEART RATE: 74 BPM

## 2021-03-18 DIAGNOSIS — H65.92 LEFT NON-SUPPURATIVE OTITIS MEDIA: Primary | ICD-10-CM

## 2021-03-18 DIAGNOSIS — E01.0 THYROMEGALY: ICD-10-CM

## 2021-03-18 DIAGNOSIS — E78.49 OTHER HYPERLIPIDEMIA: ICD-10-CM

## 2021-03-18 DIAGNOSIS — I10 ESSENTIAL HYPERTENSION: ICD-10-CM

## 2021-03-18 PROCEDURE — 4004F PT TOBACCO SCREEN RCVD TLK: CPT | Performed by: INTERNAL MEDICINE

## 2021-03-18 PROCEDURE — G8484 FLU IMMUNIZE NO ADMIN: HCPCS | Performed by: INTERNAL MEDICINE

## 2021-03-18 PROCEDURE — G8417 CALC BMI ABV UP PARAM F/U: HCPCS | Performed by: INTERNAL MEDICINE

## 2021-03-18 PROCEDURE — 99213 OFFICE O/P EST LOW 20 MIN: CPT | Performed by: INTERNAL MEDICINE

## 2021-03-18 PROCEDURE — G8427 DOCREV CUR MEDS BY ELIG CLIN: HCPCS | Performed by: INTERNAL MEDICINE

## 2021-03-18 SDOH — ECONOMIC STABILITY: INCOME INSECURITY: HOW HARD IS IT FOR YOU TO PAY FOR THE VERY BASICS LIKE FOOD, HOUSING, MEDICAL CARE, AND HEATING?: NOT HARD AT ALL

## 2021-03-18 SDOH — ECONOMIC STABILITY: FOOD INSECURITY: WITHIN THE PAST 12 MONTHS, YOU WORRIED THAT YOUR FOOD WOULD RUN OUT BEFORE YOU GOT MONEY TO BUY MORE.: NEVER TRUE

## 2021-03-18 ASSESSMENT — PATIENT HEALTH QUESTIONNAIRE - PHQ9
SUM OF ALL RESPONSES TO PHQ QUESTIONS 1-9: 0
SUM OF ALL RESPONSES TO PHQ9 QUESTIONS 1 & 2: 0
1. LITTLE INTEREST OR PLEASURE IN DOING THINGS: 0
SUM OF ALL RESPONSES TO PHQ QUESTIONS 1-9: 0
SUM OF ALL RESPONSES TO PHQ QUESTIONS 1-9: 0

## 2021-03-18 ASSESSMENT — ENCOUNTER SYMPTOMS
EYE DISCHARGE: 0
DIARRHEA: 0
VOMITING: 0
SHORTNESS OF BREATH: 0
COLOR CHANGE: 0
NAUSEA: 0
ABDOMINAL PAIN: 0
ROS SKIN COMMENTS: ACNE
RHINORRHEA: 0
COUGH: 0
BLOOD IN STOOL: 0
STRIDOR: 0
EYE ITCHING: 0
TROUBLE SWALLOWING: 0
PHOTOPHOBIA: 0
FACIAL SWELLING: 0
EYE PAIN: 0
WHEEZING: 0
CONSTIPATION: 0
SORE THROAT: 0
ANAL BLEEDING: 0

## 2021-03-18 NOTE — ASSESSMENT & PLAN NOTE
Blood pressures are stable. Continue medications and monitor blood pressures at home. Call office if systolics are over 473 over diastolics over 90.

## 2021-03-18 NOTE — PROGRESS NOTES
3/18/2021    Name: Rhonda Moreno : 1985 Sex: female  Age: 37 y.o. Subjective:  Chief Complaint   Patient presents with    3 Month Follow-Up        Hypertension  Pertinent negatives include no chest pain, headaches, palpitations or shortness of breath. Patient was treated here last week with Augmentin for left ear pain. She says ear is much better. She  has a history of hypertension, and anxiety. She is on metoprolol 50 mg twice daily and lisinopril 10 mg daily. Does not check her blood pressures at home. She will be seeing a new psychiatrist in [facility]. She is on S-Citalopram 10 mg and alprazolam 0.25 mg. Te. She gets a Depo-Provera injections from her gynecologist Dr. Marina Guaman in [place]. She sees him for her Paps and pelvics. She is morbidly obese    She recently saw Dr Donnie Sousa for her acne. He started her on doxycycline 100 mg twice a day. ... She is off of this now. This did not really work so she is going back to see him soon. Александр Mcintyre She will be coming in in the future for a flu shot and her fasting blood work    Review of Systems   Constitutional: Negative for appetite change, fatigue and unexpected weight change. HENT: Negative for congestion, ear pain, facial swelling, rhinorrhea, sore throat, tinnitus and trouble swallowing. Eyes: Negative for photophobia, pain, discharge, itching and visual disturbance. Respiratory: Negative for cough, shortness of breath, wheezing and stridor. Cardiovascular: Negative for chest pain, palpitations and leg swelling. Gastrointestinal: Negative for abdominal pain, anal bleeding, blood in stool, constipation, diarrhea, nausea and vomiting. Endocrine: Negative for cold intolerance, heat intolerance, polydipsia, polyphagia and polyuria. Genitourinary: Negative for difficulty urinating, dysuria, flank pain, frequency, hematuria and urgency.    Musculoskeletal: Negative for arthralgias, gait problem, joint swelling and myalgias. Skin: Negative for color change, pallor and rash. acne   Allergic/Immunologic: Negative for environmental allergies and food allergies. Neurological: Negative for dizziness, tremors, seizures, syncope, speech difficulty, weakness, light-headedness, numbness and headaches. Hematological: Negative for adenopathy. Does not bruise/bleed easily. Psychiatric/Behavioral: Negative for agitation, behavioral problems, confusion, sleep disturbance and suicidal ideas. The patient is not nervous/anxious. Current Outpatient Medications:     brompheniramine-pseudoephedrine-DM 2-30-10 MG/5ML syrup, Take 5 mLs by mouth 4 times daily as needed for Congestion or Cough, Disp: 120 mL, Rfl: 0    amoxicillin-clavulanate (AUGMENTIN) 875-125 MG per tablet, Take 1 tablet by mouth 2 times daily for 10 days, Disp: 20 tablet, Rfl: 0    ALPRAZolam (XANAX) 0.25 MG tablet, Take 1 tablet by mouth nightly as needed for Sleep or Anxiety for up to 38 days. , Disp: 38 tablet, Rfl: 0    escitalopram (LEXAPRO) 10 MG tablet, TAKE 1 TABLET BY MOUTH DAILY, Disp: 30 tablet, Rfl: 0    gabapentin (NEURONTIN) 300 MG capsule, TAKE 1 CAPSULE BY MOUTH TWICE DAILY, Disp: 60 capsule, Rfl: 5    medroxyPROGESTERone (DEPO-PROVERA) 150 MG/ML injection, Inject 150 mg into the muscle every 3 months, Disp: , Rfl:     metoprolol tartrate (LOPRESSOR) 50 MG tablet, Take 1.5 tablets by mouth 2 times daily, Disp: 135 tablet, Rfl: 1    lisinopril (PRINIVIL;ZESTRIL) 10 MG tablet, Take 1 tablet by mouth daily, Disp: 30 tablet, Rfl: 5     Allergies   Allergen Reactions    No Known Allergies         Past Medical History:   Diagnosis Date    Dental abscess 4/23/2020       Health Maintenance Due   Topic Date Due    Hepatitis C screen  Never done    Varicella vaccine (1 of 2 - 2-dose childhood series) Never done    HIV screen  Never done    Cervical cancer screen  Never done    Flu vaccine (1) 09/01/2020    Potassium monitoring 11/11/2020    Creatinine monitoring  11/11/2020        Patient Active Problem List   Diagnosis    Essential hypertension    Flu vaccine need    Acne vulgaris    Alopecia    Thyromegaly    Other hyperlipidemia    Anxiety    Left non-suppurative otitis media        Past Surgical History:   Procedure Laterality Date    TYMPANOSTOMY TUBE PLACEMENT          Family History   Problem Relation Age of Onset    High Blood Pressure Father     Diabetes Father         Social History     Tobacco Use    Smoking status: Current Every Day Smoker     Packs/day: 0.50     Types: Cigarettes    Smokeless tobacco: Never Used   Substance Use Topics    Alcohol use: Not on file    Drug use: Not on file        Objective  Vitals:    03/18/21 1324   BP: 130/72   Pulse: 74   Resp: 20   Temp: 97.5 °F (36.4 °C)   TempSrc: Temporal   SpO2: 98%   Weight: (!) 367 lb (166.5 kg)   Height: 5' 10\" (1.778 m)        Exam:  Physical Exam  Vitals signs reviewed. Constitutional:       General: She is not in acute distress. Appearance: She is well-developed. She is obese. She is not ill-appearing. HENT:      Head: Normocephalic. Right Ear: External ear normal.      Left Ear: External ear normal.   Eyes:      General: No scleral icterus. Right eye: No discharge. Left eye: No discharge. Conjunctiva/sclera: Conjunctivae normal.      Pupils: Pupils are equal, round, and reactive to light. Neck:      Musculoskeletal: Normal range of motion and neck supple. Thyroid: No thyromegaly. Comments: Thyroid slightly enlarged  Cardiovascular:      Rate and Rhythm: Normal rate and regular rhythm. Heart sounds: Normal heart sounds. No murmur. No friction rub. No gallop. Pulmonary:      Effort: Pulmonary effort is normal. No respiratory distress. Breath sounds: Normal breath sounds. No wheezing or rales. Chest:      Chest wall: No tenderness.    Abdominal:      General: Bowel sounds are normal. There is no distension. Palpations: Abdomen is soft. There is no mass. Tenderness: There is no abdominal tenderness. There is no guarding or rebound. Musculoskeletal: Normal range of motion. General: No tenderness or deformity. Lymphadenopathy:      Cervical: No cervical adenopathy. Skin:     General: Skin is warm and dry. Coloration: Skin is not pale. Findings: No erythema or rash. Comments: Few acneiform lesions on her face   Neurological:      Mental Status: She is alert and oriented to person, place, and time. Cranial Nerves: No cranial nerve deficit. Sensory: No sensory deficit. Deep Tendon Reflexes: Reflexes normal.   Psychiatric:         Mood and Affect: Mood normal.         Behavior: Behavior normal.         Thought Content: Thought content normal.         Judgment: Judgment normal.          Last labs reviewed. ASSESSMENT & PLAN :   Problem List        Circulatory    Essential hypertension     Blood pressures are stable. Continue medications and monitor blood pressures at home. Call office if systolics are over 355 over diastolics over 90. Relevant Orders    Comprehensive Metabolic Panel       Endocrine    Thyromegaly     Check TSH when she comes back for her blood work         Relevant Orders    TSH without Reflex       Nervous and Auditory    Left non-suppurative otitis media - Primary     Improved on Augmentin. Continue medications         Relevant Medications    amoxicillin-clavulanate (AUGMENTIN) 875-125 MG per tablet       Other    Other hyperlipidemia     Watch saturated fats in diet and will monitor lipids         Relevant Orders    Lipid Panel           Return in about 3 months (around 6/18/2021).        Scar Lilly, DO  3/18/2021

## 2021-05-14 DIAGNOSIS — I10 ESSENTIAL HYPERTENSION: ICD-10-CM

## 2021-05-17 RX ORDER — METOPROLOL TARTRATE 50 MG/1
75 TABLET, FILM COATED ORAL 2 TIMES DAILY
Qty: 135 TABLET | Refills: 1 | Status: SHIPPED
Start: 2021-05-17 | End: 2021-06-23 | Stop reason: SDUPTHER

## 2021-06-22 DIAGNOSIS — E78.49 OTHER HYPERLIPIDEMIA: ICD-10-CM

## 2021-06-22 DIAGNOSIS — I10 ESSENTIAL HYPERTENSION: ICD-10-CM

## 2021-06-22 DIAGNOSIS — E01.0 THYROMEGALY: ICD-10-CM

## 2021-06-22 LAB
ALBUMIN SERPL-MCNC: 3.8 G/DL (ref 3.5–5.2)
ALP BLD-CCNC: 48 U/L (ref 35–104)
ALT SERPL-CCNC: 20 U/L (ref 0–32)
ANION GAP SERPL CALCULATED.3IONS-SCNC: 8 MMOL/L (ref 7–16)
AST SERPL-CCNC: 19 U/L (ref 0–31)
BILIRUB SERPL-MCNC: 0.3 MG/DL (ref 0–1.2)
BUN BLDV-MCNC: 16 MG/DL (ref 6–20)
CALCIUM SERPL-MCNC: 9.1 MG/DL (ref 8.6–10.2)
CHLORIDE BLD-SCNC: 105 MMOL/L (ref 98–107)
CHOLESTEROL, TOTAL: 164 MG/DL (ref 0–199)
CO2: 24 MMOL/L (ref 22–29)
CREAT SERPL-MCNC: 0.7 MG/DL (ref 0.5–1)
GFR AFRICAN AMERICAN: >60
GFR NON-AFRICAN AMERICAN: >60 ML/MIN/1.73
GLUCOSE BLD-MCNC: 100 MG/DL (ref 74–99)
HDLC SERPL-MCNC: 30 MG/DL
LDL CHOLESTEROL CALCULATED: 105 MG/DL (ref 0–99)
POTASSIUM SERPL-SCNC: 4.6 MMOL/L (ref 3.5–5)
SODIUM BLD-SCNC: 137 MMOL/L (ref 132–146)
TOTAL PROTEIN: 7.2 G/DL (ref 6.4–8.3)
TRIGL SERPL-MCNC: 144 MG/DL (ref 0–149)
TSH SERPL DL<=0.05 MIU/L-ACNC: 2.18 UIU/ML (ref 0.27–4.2)
VLDLC SERPL CALC-MCNC: 29 MG/DL

## 2021-06-23 ENCOUNTER — OFFICE VISIT (OUTPATIENT)
Dept: PRIMARY CARE CLINIC | Age: 36
End: 2021-06-23
Payer: MEDICAID

## 2021-06-23 VITALS
HEART RATE: 87 BPM | TEMPERATURE: 98.2 F | OXYGEN SATURATION: 97 % | HEIGHT: 70 IN | BODY MASS INDEX: 52.66 KG/M2 | SYSTOLIC BLOOD PRESSURE: 138 MMHG | DIASTOLIC BLOOD PRESSURE: 80 MMHG

## 2021-06-23 DIAGNOSIS — E66.01 MORBID OBESITY WITH BODY MASS INDEX (BMI) OF 50.0 TO 59.9 IN ADULT (HCC): ICD-10-CM

## 2021-06-23 DIAGNOSIS — I10 ESSENTIAL HYPERTENSION: ICD-10-CM

## 2021-06-23 DIAGNOSIS — F41.9 ANXIETY: ICD-10-CM

## 2021-06-23 DIAGNOSIS — E78.49 OTHER HYPERLIPIDEMIA: Primary | ICD-10-CM

## 2021-06-23 PROCEDURE — G8427 DOCREV CUR MEDS BY ELIG CLIN: HCPCS | Performed by: INTERNAL MEDICINE

## 2021-06-23 PROCEDURE — 99213 OFFICE O/P EST LOW 20 MIN: CPT | Performed by: INTERNAL MEDICINE

## 2021-06-23 PROCEDURE — G8417 CALC BMI ABV UP PARAM F/U: HCPCS | Performed by: INTERNAL MEDICINE

## 2021-06-23 PROCEDURE — 4004F PT TOBACCO SCREEN RCVD TLK: CPT | Performed by: INTERNAL MEDICINE

## 2021-06-23 RX ORDER — METOPROLOL TARTRATE 50 MG/1
75 TABLET, FILM COATED ORAL 2 TIMES DAILY
Qty: 135 TABLET | Refills: 1 | Status: SHIPPED
Start: 2021-06-23 | End: 2021-09-29 | Stop reason: SDUPTHER

## 2021-06-23 RX ORDER — LISINOPRIL 10 MG/1
10 TABLET ORAL DAILY
Qty: 30 TABLET | Refills: 5 | Status: SHIPPED
Start: 2021-06-23 | End: 2021-09-29 | Stop reason: SDUPTHER

## 2021-06-23 RX ORDER — ALPRAZOLAM 0.25 MG/1
TABLET ORAL
COMMUNITY
Start: 2021-05-14

## 2021-06-23 ASSESSMENT — ENCOUNTER SYMPTOMS
TROUBLE SWALLOWING: 0
ABDOMINAL PAIN: 0
EYE DISCHARGE: 0
COUGH: 0
EYE ITCHING: 0
COLOR CHANGE: 0
PHOTOPHOBIA: 0
ROS SKIN COMMENTS: ACNE
RHINORRHEA: 0
VOMITING: 0
ANAL BLEEDING: 0
EYE PAIN: 0
SHORTNESS OF BREATH: 0
NAUSEA: 0
SORE THROAT: 0
STRIDOR: 0
CONSTIPATION: 0
WHEEZING: 0
BLOOD IN STOOL: 0
FACIAL SWELLING: 0
DIARRHEA: 0

## 2021-06-23 NOTE — ASSESSMENT & PLAN NOTE
Blood pressures are stable. Continue medications and monitor blood pressures at home. Call office if systolics are over 344 over diastolics over 90.

## 2021-06-23 NOTE — ASSESSMENT & PLAN NOTE
will look into which weight loss drugs she can try. She is not willing to undergo bariatric surgery at this time. She is trying to watch what she eats.   She will also need nutritional counseling

## 2021-06-23 NOTE — PROGRESS NOTES
2021    Name: Taina Block : 1985 Sex: female  Age: 37 y.o. Subjective:  Chief Complaint   Patient presents with    Hypertension        Hypertension  Pertinent negatives include no chest pain, headaches, palpitations or shortness of breath. She  has a history of hypertension, and anxiety. She is on metoprolol 50 mg twice daily and lisinopril 10 mg daily. Does not check her blood pressures at home. She will be seeing a new psychiatrist in [facility]. She is on Escitalopram 10 mg and alprazolam 0.25 mg. Te. She gets a Depo-Provera injections from her gynecologist Dr. Gerry Milian in [place]. She sees him for her Paps and pelvics. She is morbidly obese    She recently saw Dr Tuyet Veliz for her acne. He started her on doxycycline 100 mg twice a day. ... She is off of this now. This did not really work so she is going back to see him soon. .  Blood work reviewed. Total cholesterol was 164 with an LDL of 105. Fasting blood sugar was 100. TSH was normal.    She had her COVID-19 vaccine series. She will bring in her card. She had her Tdap in 2017    She would like to try one of the newer weight loss medication such as semaglutide injection to help her lose weight. Review of Systems   Constitutional: Negative for appetite change, fatigue and unexpected weight change. HENT: Negative for congestion, ear pain, facial swelling, rhinorrhea, sore throat, tinnitus and trouble swallowing. Eyes: Negative for photophobia, pain, discharge, itching and visual disturbance. Respiratory: Negative for cough, shortness of breath, wheezing and stridor. Cardiovascular: Negative for chest pain, palpitations and leg swelling. Gastrointestinal: Negative for abdominal pain, anal bleeding, blood in stool, constipation, diarrhea, nausea and vomiting. Endocrine: Negative for cold intolerance, heat intolerance, polydipsia, polyphagia and polyuria.    Genitourinary: Negative for difficulty tenderness or deformity. Normal range of motion. Cervical back: Normal range of motion and neck supple. Lymphadenopathy:      Cervical: No cervical adenopathy. Skin:     General: Skin is warm and dry. Coloration: Skin is not pale. Findings: No erythema or rash. Comments: Few acneiform lesions on her face   Neurological:      Mental Status: She is alert and oriented to person, place, and time. Cranial Nerves: No cranial nerve deficit. Sensory: No sensory deficit. Deep Tendon Reflexes: Reflexes normal.   Psychiatric:         Mood and Affect: Mood normal.         Behavior: Behavior normal.         Thought Content: Thought content normal.         Judgment: Judgment normal.          Last labs reviewed. ASSESSMENT & PLAN :   Problem List        Circulatory    Essential hypertension     Blood pressures are stable. Continue medications and monitor blood pressures at home. Call office if systolics are over 110 over diastolics over 90. Relevant Medications    lisinopril (PRINIVIL;ZESTRIL) 10 MG tablet    metoprolol tartrate (LOPRESSOR) 50 MG tablet       Other    Other hyperlipidemia - Primary       Watch saturated fats in diet and will monitor lipids         Relevant Medications    lisinopril (PRINIVIL;ZESTRIL) 10 MG tablet    metoprolol tartrate (LOPRESSOR) 50 MG tablet    Anxiety       continue follow-up with her psychiatrist who prescribes escitalopram and  alprazolam         Relevant Medications    escitalopram (LEXAPRO) 10 MG tablet    ALPRAZolam (XANAX) 0.25 MG tablet    Morbid obesity with body mass index (BMI) of 50.0 to 59.9 in [facility])       will look into which weight loss drugs she can try. She is not willing to undergo bariatric surgery at this time. She is trying to watch what she eats. She will also need nutritional counseling                Return in about 3 months (around 9/23/2021), or Htn.        Carly Templeton, DO  6/23/2021

## 2021-09-08 ENCOUNTER — OFFICE VISIT (OUTPATIENT)
Dept: PRIMARY CARE CLINIC | Age: 36
End: 2021-09-08
Payer: MEDICAID

## 2021-09-08 VITALS
WEIGHT: 293 LBS | DIASTOLIC BLOOD PRESSURE: 80 MMHG | TEMPERATURE: 97.3 F | SYSTOLIC BLOOD PRESSURE: 122 MMHG | BODY MASS INDEX: 41.95 KG/M2 | OXYGEN SATURATION: 96 % | HEART RATE: 107 BPM | HEIGHT: 70 IN

## 2021-09-08 DIAGNOSIS — H60.311 ACUTE DIFFUSE OTITIS EXTERNA OF RIGHT EAR: ICD-10-CM

## 2021-09-08 DIAGNOSIS — H66.011 NON-RECURRENT ACUTE SUPPURATIVE OTITIS MEDIA OF RIGHT EAR WITH SPONTANEOUS RUPTURE OF TYMPANIC MEMBRANE: Primary | ICD-10-CM

## 2021-09-08 DIAGNOSIS — H92.01 RIGHT EAR PAIN: ICD-10-CM

## 2021-09-08 PROCEDURE — G8417 CALC BMI ABV UP PARAM F/U: HCPCS | Performed by: INTERNAL MEDICINE

## 2021-09-08 PROCEDURE — 4130F TOPICAL PREP RX AOE: CPT | Performed by: INTERNAL MEDICINE

## 2021-09-08 PROCEDURE — G8427 DOCREV CUR MEDS BY ELIG CLIN: HCPCS | Performed by: INTERNAL MEDICINE

## 2021-09-08 PROCEDURE — 99213 OFFICE O/P EST LOW 20 MIN: CPT | Performed by: INTERNAL MEDICINE

## 2021-09-08 PROCEDURE — 4004F PT TOBACCO SCREEN RCVD TLK: CPT | Performed by: INTERNAL MEDICINE

## 2021-09-08 RX ORDER — CITALOPRAM 20 MG/1
TABLET ORAL
COMMUNITY
Start: 2021-08-31

## 2021-09-08 RX ORDER — BUSPIRONE HYDROCHLORIDE 7.5 MG/1
TABLET ORAL
COMMUNITY
Start: 2021-08-21 | End: 2022-02-07 | Stop reason: ALTCHOICE

## 2021-09-08 RX ORDER — LEVOFLOXACIN 500 MG/1
500 TABLET, FILM COATED ORAL DAILY
Qty: 7 TABLET | Refills: 0 | Status: SHIPPED
Start: 2021-09-08 | End: 2021-09-09 | Stop reason: SINTOL

## 2021-09-08 RX ORDER — OFLOXACIN 3 MG/ML
SOLUTION AURICULAR (OTIC)
COMMUNITY
Start: 2021-09-04 | End: 2021-09-29 | Stop reason: ALTCHOICE

## 2021-09-08 RX ORDER — AMOXICILLIN AND CLAVULANATE POTASSIUM 500; 125 MG/1; MG/1
TABLET, FILM COATED ORAL
COMMUNITY
Start: 2021-09-04 | End: 2021-09-09

## 2021-09-08 RX ORDER — MIRTAZAPINE 15 MG/1
TABLET, FILM COATED ORAL
COMMUNITY
Start: 2021-08-21 | End: 2022-02-07

## 2021-09-08 RX ORDER — HYDROCORTISONE AND ACETIC ACID 20.75; 10.375 MG/ML; MG/ML
3 SOLUTION AURICULAR (OTIC) 2 TIMES DAILY
Qty: 1 EACH | Refills: 1 | Status: SHIPPED
Start: 2021-09-08 | End: 2021-09-13 | Stop reason: ALTCHOICE

## 2021-09-08 NOTE — PROGRESS NOTES
2021    Name: Sully Ochoa : 1985 Sex: female  Age: 37 y.o. Subjective:  Chief Complaint   Patient presents with    Otalgia     ruptured ear drum. [facility] is complaining of pain in her right ear, coupled by some drainage. No fever or chills. No nausea or vomiting. She had a televisit health visit with urgent care at [place] and they prescribed a quinolone eardrop for her she tried it that day it was extremely painful so she went in to the urgent care clinic in [facility] that same day and was prescribed Amoxil. She was also told that she had a ruptured eardrum. She complains of tinnitus and a pulsating sensation in her right ear. She says the external canal is extremely itchy. She still has some drainage. Complicating factor is a trip that she has planned for next Tuesday to the beach. She will be flying. She called different ENTs around the area and was told she did not need referral or that they could not see her in a timely fashion. She has a history of hypertension and anxiety. She also has morbid obesity. Medications reviewed. Review of Systems   Constitutional: Negative for chills, diaphoresis, fatigue and fever. HENT: Positive for ear discharge, ear pain and tinnitus. Respiratory: Negative for chest tightness and shortness of breath. Cardiovascular: Negative for chest pain, palpitations and leg swelling. Hematological: Does not bruise/bleed easily.           Current Outpatient Medications:     ofloxacin (FLOXIN) 0.3 % otic solution, INSTILL 5 DROPS INTO THE LEFT EAR TWICE DAILY FOR 7 DAYS, Disp: , Rfl:     mirtazapine (REMERON) 15 MG tablet, TAKE ONE TABLET BY MOUTH AT BEDTIME, Disp: , Rfl:     citalopram (CELEXA) 20 MG tablet, TAKE ONE TABLET BY MOUTH ONCE DAILY, Disp: , Rfl:     busPIRone (BUSPAR) 7.5 MG tablet, TAKE ONE TABLET BY MOUTH TWO TIMES A DAY, Disp: , Rfl:     acetic acid-hydrocortisone (VOSOL-HC) 1-2 % otic solution, Place 3 drops into the right ear 2 times daily for 10 days, Disp: 1 each, Rfl: 1    lisinopril (PRINIVIL;ZESTRIL) 10 MG tablet, Take 1 tablet by mouth daily, Disp: 30 tablet, Rfl: 5    metoprolol tartrate (LOPRESSOR) 50 MG tablet, Take 1.5 tablets by mouth 2 times daily, Disp: 135 tablet, Rfl: 1    ALPRAZolam (XANAX) 0.25 MG tablet, , Disp: , Rfl:     gabapentin (NEURONTIN) 300 MG capsule, TAKE 1 CAPSULE BY MOUTH TWICE DAILY, Disp: 60 capsule, Rfl: 5    medroxyPROGESTERone (DEPO-PROVERA) 150 MG/ML injection, Inject 150 mg into the muscle every 3 months, Disp: , Rfl:     cefdinir (OMNICEF) 300 MG capsule, Take 1 capsule by mouth 2 times daily for 10 days, Disp: 20 capsule, Rfl: 0     Allergies   Allergen Reactions    No Known Allergies         Past Medical History:   Diagnosis Date    Dental abscess 4/23/2020       Health Maintenance Due   Topic Date Due    Hepatitis C screen  Never done    Varicella vaccine (1 of 2 - 2-dose childhood series) Never done    Pneumococcal 0-64 years Vaccine (1 of 2 - PPSV23) Never done    HIV screen  Never done    Cervical cancer screen  Never done    Flu vaccine (1) 09/01/2021        Patient Active Problem List   Diagnosis    Essential hypertension    Flu vaccine need    Acne vulgaris    Alopecia    Thyromegaly    Other hyperlipidemia    Anxiety    Morbid obesity with body mass index (BMI) of 50.0 to 59.9 in adult [facility])    Non-recurrent acute suppurative otitis media of right ear with spontaneous rupture of tympanic membrane    Acute diffuse otitis externa of right ear    Right ear pain        Past Surgical History:   Procedure Laterality Date    TYMPANOSTOMY TUBE PLACEMENT          Family History   Problem Relation Age of Onset    High Blood Pressure Father     Diabetes Father         Social History     Tobacco Use    Smoking status: Current Every Day Smoker     Packs/day: 0.50     Types: Cigarettes    Smokeless tobacco: Never Used   Substance Use Topics  Alcohol use: Not on file    Drug use: Not on file        Objective  Vitals:    09/08/21 1601   BP: 122/80   Pulse: 107   Temp: 97.3 °F (36.3 °C)   SpO2: 96%   Weight: (!) 377 lb (171 kg)   Height: 5' 10\" (1.778 m)        Exam:  Physical Exam  Constitutional:       General: She is not in acute distress. Appearance: Normal appearance. She is not ill-appearing. Comments: Morbidly obese   HENT:      Head: Normocephalic and atraumatic. Right Ear: External ear normal. There is no impacted cerumen. Left Ear: Tympanic membrane, ear canal and external ear normal. There is no impacted cerumen. Ears:      Comments: She has a little bit of cerumen in her left EAC. Left TM is normal.  Right TM is ruptured. She has excoriation of the right EAC. No drainage noted. She has some erythema of the right TM. Nose: Nose normal. No congestion. Mouth/Throat:      Mouth: Mucous membranes are moist.      Pharynx: Oropharynx is clear. No oropharyngeal exudate or posterior oropharyngeal erythema. Eyes:      General: No scleral icterus. Right eye: No discharge. Left eye: No discharge. Conjunctiva/sclera: Conjunctivae normal.   Neck:      Comments: Tender to palpation submandibular area right. No cervical lymphadenopathy  Cardiovascular:      Rate and Rhythm: Normal rate and regular rhythm. Heart sounds: Normal heart sounds. Pulmonary:      Effort: Pulmonary effort is normal. No respiratory distress. Breath sounds: Normal breath sounds. Musculoskeletal:      Cervical back: Normal range of motion and neck supple. Lymphadenopathy:      Cervical: No cervical adenopathy. Neurological:      General: No focal deficit present. Mental Status: She is alert and oriented to person, place, and time. Psychiatric:         Mood and Affect: Mood normal.         Behavior: Behavior normal.         Thought Content: Thought content normal.          Last labs reviewed. ASSESSMENT & PLAN :   Problem List        Nervous and Auditory    Non-recurrent acute suppurative otitis media of right ear with spontaneous rupture of tympanic membrane - Primary       urgent referral to an ENT that can take her. We will [facility] additional [facility] Initially prescribed Levaquin but pharmacy says there was a contraindication to use Levaquin as she is on Celexa and there may be a predisposition to cardiac arrhythmia. Since Levaquin was discontinued and she was placed on cefdinir 300 mg twice daily for 10 days. I recommended that if she still was having problems that she not fly and cancel her trip next week         Relevant Medications    cefdinir (OMNICEF) 300 MG capsule    Other Relevant Orders    [place] - Theresa Bearded., DO, Otolaryngology, [place]    Acute diffuse otitis externa of right ear       VoSoL HC drops 3-4 times a day right ear try using a week instead of instilling the drops into the deep part of her auditory canal.  If she has a lot of pain to discontinue these drops. Relevant Medications    ofloxacin (FLOXIN) 0.3 % otic solution    acetic acid-hydrocortisone (VOSOL-HC) 1-2 % otic solution    Other Relevant Orders    Josey - Theresa Bearded., DO, Otolaryngology, [facility]       Other    Right ear pain       acetaminophen or ibuprofen for pain         Relevant Medications    acetic acid-hydrocortisone (VOSOL-HC) 1-2 % otic solution    Other Relevant Orders    [facility] Theresa Bearded[facility] DO, Otolaryngology, [facility]           Return in about 3 weeks (around 9/29/2021), or otitis media.        Tomasa Moore DO  9/9/2021

## 2021-09-09 ENCOUNTER — TELEPHONE (OUTPATIENT)
Dept: PRIMARY CARE CLINIC | Age: 36
End: 2021-09-09

## 2021-09-09 DIAGNOSIS — H66.011 NON-RECURRENT ACUTE SUPPURATIVE OTITIS MEDIA OF RIGHT EAR WITH SPONTANEOUS RUPTURE OF TYMPANIC MEMBRANE: Primary | ICD-10-CM

## 2021-09-09 RX ORDER — CEFDINIR 300 MG/1
300 CAPSULE ORAL 2 TIMES DAILY
Qty: 20 CAPSULE | Refills: 0 | Status: SHIPPED | OUTPATIENT
Start: 2021-09-09 | End: 2021-09-19

## 2021-09-09 ASSESSMENT — ENCOUNTER SYMPTOMS
CHEST TIGHTNESS: 0
SHORTNESS OF BREATH: 0

## 2021-09-09 NOTE — TELEPHONE ENCOUNTER
Patient was prescribed Levaquin 500 mg. She is also on Celexa 20 mg daily. Pharmacy was calling to make Doctor aware that this combination can cause arhythmia. Would you like something else called in?      Please advise

## 2021-09-13 ENCOUNTER — PROCEDURE VISIT (OUTPATIENT)
Dept: AUDIOLOGY | Age: 36
End: 2021-09-13
Payer: MEDICAID

## 2021-09-13 ENCOUNTER — OFFICE VISIT (OUTPATIENT)
Dept: ENT CLINIC | Age: 36
End: 2021-09-13
Payer: MEDICAID

## 2021-09-13 VITALS
SYSTOLIC BLOOD PRESSURE: 125 MMHG | HEART RATE: 101 BPM | DIASTOLIC BLOOD PRESSURE: 85 MMHG | BODY MASS INDEX: 53.81 KG/M2 | WEIGHT: 293 LBS

## 2021-09-13 DIAGNOSIS — H66.001 ACUTE SUPPURATIVE OTITIS MEDIA OF RIGHT EAR WITHOUT SPONTANEOUS RUPTURE OF TYMPANIC MEMBRANE, RECURRENCE NOT SPECIFIED: ICD-10-CM

## 2021-09-13 DIAGNOSIS — H66.011 NON-RECURRENT ACUTE SUPPURATIVE OTITIS MEDIA OF RIGHT EAR WITH SPONTANEOUS RUPTURE OF TYMPANIC MEMBRANE: Primary | ICD-10-CM

## 2021-09-13 DIAGNOSIS — H61.23 BILATERAL IMPACTED CERUMEN: Primary | ICD-10-CM

## 2021-09-13 DIAGNOSIS — H60.391 OTHER INFECTIVE ACUTE OTITIS EXTERNA OF RIGHT EAR: ICD-10-CM

## 2021-09-13 PROCEDURE — 4130F TOPICAL PREP RX AOE: CPT | Performed by: NURSE PRACTITIONER

## 2021-09-13 PROCEDURE — 69210 REMOVE IMPACTED EAR WAX UNI: CPT | Performed by: NURSE PRACTITIONER

## 2021-09-13 PROCEDURE — 4004F PT TOBACCO SCREEN RCVD TLK: CPT | Performed by: NURSE PRACTITIONER

## 2021-09-13 PROCEDURE — G8427 DOCREV CUR MEDS BY ELIG CLIN: HCPCS | Performed by: NURSE PRACTITIONER

## 2021-09-13 PROCEDURE — G8417 CALC BMI ABV UP PARAM F/U: HCPCS | Performed by: NURSE PRACTITIONER

## 2021-09-13 PROCEDURE — 99203 OFFICE O/P NEW LOW 30 MIN: CPT | Performed by: NURSE PRACTITIONER

## 2021-09-13 PROCEDURE — 92567 TYMPANOMETRY: CPT | Performed by: AUDIOLOGIST

## 2021-09-13 RX ORDER — CIPROFLOXACIN HYDROCHLORIDE 3.5 MG/ML
4 SOLUTION/ DROPS TOPICAL 2 TIMES DAILY
Qty: 1 EACH | Refills: 0 | Status: SHIPPED | OUTPATIENT
Start: 2021-09-13 | End: 2021-09-20

## 2021-09-13 NOTE — PROGRESS NOTES
solution, INSTILL 5 DROPS INTO THE LEFT EAR TWICE DAILY FOR 7 DAYS (Patient not taking: Reported on 9/13/2021), Disp: , Rfl:     lisinopril (PRINIVIL;ZESTRIL) 10 MG tablet, Take 1 tablet by mouth daily, Disp: 30 tablet, Rfl: 5  No known allergies  Social History     Tobacco Use    Smoking status: Current Every Day Smoker     Packs/day: 0.50     Types: Cigarettes    Smokeless tobacco: Never Used   Substance Use Topics    Alcohol use: Not on file    Drug use: Not on file     Family History   Problem Relation Age of Onset    High Blood Pressure Father     Diabetes Father        Review of Systems   Constitutional: Negative. Negative for activity change and appetite change. HENT: Positive for ear discharge, ear pain, hearing loss and tinnitus (Pulsatile). Negative for congestion, postnasal drip, rhinorrhea, sinus pressure and sinus pain. Eyes: Negative. Respiratory: Negative. Negative for shortness of breath and stridor. Cardiovascular: Negative. Negative for chest pain and palpitations. Endocrine: Negative. Musculoskeletal: Negative. Skin: Negative. Neurological: Negative. Negative for dizziness. Hematological: Negative. Psychiatric/Behavioral: Negative. /85   Pulse 101   Wt (!) 375 lb (170.1 kg)   BMI 53.81 kg/m²   Physical Exam  Constitutional:       Appearance: Normal appearance. HENT:      Head: Normocephalic. Eyes:      Conjunctiva/sclera: Conjunctivae normal.      Pupils: Pupils are equal, round, and reactive to light. Cardiovascular:      Rate and Rhythm: Normal rate and regular rhythm. Pulses: Normal pulses. Pulmonary:      Effort: Pulmonary effort is normal. No respiratory distress. Breath sounds: No stridor. Musculoskeletal:         General: Normal range of motion. Cervical back: Normal range of motion. No rigidity. No muscular tenderness. Skin:     General: Skin is warm and dry.    Neurological:      General: No focal deficit present. Mental Status: She is alert and oriented to person, place, and time. Psychiatric:         Mood and Affect: Mood normal.         Behavior: Behavior normal.         Thought Content: Thought content normal.         Judgment: Judgment normal.             Tympanogram reviewed with patient. Flat curve of the right ear, with type a curve in the left ear. IMPRESSION/PLAN:    Cerumen removal     Auditory canal(s) both ears completely obstructed with cerumen. Cerumen was gently removed using soft plastic curette, gentle irrigation. Tympanic membranes are intact following the procedure. Auditory canal on right appears inflamed, draining. Mario Armando was seen today for ear problem. Diagnoses and all orders for this visit:    Bilateral impacted cerumen  -     FL REMOVAL IMPACTED CERUMEN INSTRUMENTATION UNILAT    Acute suppurative otitis media of right ear without spontaneous rupture of tympanic membrane, recurrence not specified    Other infective acute otitis externa of right ear    Other orders  -     ciprofloxacin (CILOXAN) 0.3 % ophthalmic solution; Place 4 drops in ear(s) 2 times daily for 7 days  -     Tympanometry; Future      Patient seen and examined found to have bilateral cerumen impactions that were removed without difficulty. After removal of the right TM shows sign of middle ear effusion with erythema of the TM. The right ear canal was also macerated with purulent drainage. At this time the patient will be placed on Flonase, 2 sprays each nostril once daily as well as Cipro drops, 4 drops to the right ear twice daily for 7 days. She is also instructed to continue with her Omnicef as previously prescribed. Patient states she will be flying within the next few days and is recommended to try Sudafed and Afrin spray prior to her flight to provide relief of right ear pressure. She will follow up in 6 weeks.   She instructed to call with any new or worsening symptoms prior to her next appointment.   Right effusion      EMERY Sibley, FNP-C  [facility], Nose and Throat    The information contained in this note has been dictated using drug and medical speech recognition software and may contain errors

## 2021-09-13 NOTE — PROGRESS NOTES
This patient was referred for audiometric/tympanometric testing by SAMMIE Pritchard due to reported tympanic membrane perforation, on the right. Tympanometry revealed a flat tympanogram, in the right ear and  normal middle ear peak pressure and compliance, in the left ear. The results were reviewed with the patient. Recommendations for follow up will be made pending physician consult.       Kenneth Rodas CCC-A  [address] [facility] [id number]  Electronically signed by Kenneth Rodas on 9/13/2021 at 1:59 PM

## 2021-09-15 ENCOUNTER — TELEPHONE (OUTPATIENT)
Dept: PRIMARY CARE CLINIC | Age: 36
End: 2021-09-15

## 2021-09-15 NOTE — TELEPHONE ENCOUNTER
Last Appointment:  9/8/2021  Future Appointments   Date Time Provider Rolando Mcdermottisti   9/29/2021 11:00 AM [facility]   10/27/2021  1:00 PM JOSSELYN Wharton - CNP [facility]      Ear drops prescribed for patient require prior auth. Kirill Peter is requesting it be done or a new script for something else. I started PA.     Electronically signed by Trav Saunders LPN on 8/10/8846 at 87:39 AM

## 2021-09-24 ASSESSMENT — ENCOUNTER SYMPTOMS
SHORTNESS OF BREATH: 0
SINUS PRESSURE: 0
RHINORRHEA: 0
RESPIRATORY NEGATIVE: 1
SINUS PAIN: 0
STRIDOR: 0
EYES NEGATIVE: 1

## 2021-09-28 ASSESSMENT — ENCOUNTER SYMPTOMS
CHEST TIGHTNESS: 0
SHORTNESS OF BREATH: 0

## 2021-09-28 NOTE — PROGRESS NOTES
depression and anxiety follows up with [facility] counseling in [place], [place]. Her counselor is Sharif Miles(daniel)  . He has her on alprazolam 0.25 as needed buspirone 7.5 mg and citalopram 20 mg. She was also given gabapentin because escitalopram caused restless legs. She is also on mirtazapine 15 mg. She had a Pap test done by Dr. Ashley Bonillar in [place] earlier this month. We will get the results. Review of Systems   Constitutional: Negative for appetite change, chills, diaphoresis, fatigue, fever and unexpected weight change. HENT: Positive for hearing loss. Negative for congestion, ear discharge, ear pain, facial swelling, rhinorrhea, sore throat, tinnitus and trouble swallowing. Eyes: Negative for photophobia, pain, discharge, itching and visual disturbance. Respiratory: Negative for cough, chest tightness, shortness of breath, wheezing and stridor. Cardiovascular: Negative for chest pain, palpitations and leg swelling. Gastrointestinal: Negative for abdominal pain, anal bleeding, blood in stool, constipation, diarrhea, nausea and vomiting. Endocrine: Negative for cold intolerance, heat intolerance, polydipsia, polyphagia and polyuria. Genitourinary: Negative for difficulty urinating, dysuria, flank pain, frequency, hematuria and urgency. Musculoskeletal: Negative for arthralgias, back pain, gait problem, joint swelling and myalgias. Left foot pain   Skin: Negative for color change, pallor and rash. Allergic/Immunologic: Negative for environmental allergies and food allergies. Neurological: Negative for dizziness, tremors, seizures, syncope, speech difficulty, weakness, light-headedness, numbness and headaches. Hematological: Negative for adenopathy. Does not bruise/bleed easily. Psychiatric/Behavioral: Negative for agitation, behavioral problems, confusion, sleep disturbance and suicidal ideas. The patient is not nervous/anxious.            Current Outpatient  Smoking status: Current Every Day Smoker     Packs/day: 0.50     Types: Cigarettes    Smokeless tobacco: Never Used   Substance Use Topics    Alcohol use: Not on file    Drug use: Not on file        Objective  Vitals:    09/29/21 1106   BP: 122/84   Pulse: 89   Temp: 97.6 °F (36.4 °C)   SpO2: 96%   Weight: (!) 378 lb (171.5 kg)   Height: 5' 10\" (1.778 m)        Exam:  Physical Exam  Constitutional:       General: She is not in acute distress. Appearance: Normal appearance. She is well-developed. She is not ill-appearing. Comments: Morbidly obese   HENT:      Head: Normocephalic and atraumatic. Right Ear: Tympanic membrane, ear canal and external ear normal. There is no impacted cerumen. Left Ear: Tympanic membrane, ear canal and external ear normal. There is no impacted cerumen. Ears:      Comments: Bilateral EACs are normal.  Tympanic membranes are not injected. Decreased hearing to finger rustling on the right     Nose: Nose normal. No congestion. Mouth/Throat:      Mouth: Mucous membranes are moist.      Pharynx: Oropharynx is clear. No oropharyngeal exudate or posterior oropharyngeal erythema. Eyes:      General: No scleral icterus. Right eye: No discharge. Left eye: No discharge. Conjunctiva/sclera: Conjunctivae normal.      Pupils: Pupils are equal, round, and reactive to light. Neck:      Thyroid: No thyromegaly. Comments: Tender to palpation submandibular area right. No cervical lymphadenopathy  Cardiovascular:      Rate and Rhythm: Normal rate and regular rhythm. Heart sounds: Normal heart sounds. No murmur heard. No friction rub. No gallop. Pulmonary:      Effort: Pulmonary effort is normal. No respiratory distress. Breath sounds: Normal breath sounds. No wheezing or rales. Chest:      Chest wall: No tenderness. Abdominal:      General: Bowel sounds are normal. There is no distension. Palpations: Abdomen is soft. There is no mass. Tenderness: There is no abdominal tenderness. There is no guarding or rebound. Musculoskeletal:         General: No tenderness or deformity. Normal range of motion. Cervical back: Normal range of motion and neck supple. Lymphadenopathy:      Cervical: No cervical adenopathy. Skin:     General: Skin is warm and dry. Coloration: Skin is not pale. Findings: No erythema or rash. Neurological:      General: No focal deficit present. Mental Status: She is alert and oriented to person, place, and time. Cranial Nerves: No cranial nerve deficit. Sensory: No sensory deficit. Deep Tendon Reflexes: Reflexes normal.   Psychiatric:         Mood and Affect: Mood normal.         Behavior: Behavior normal.         Thought Content: Thought content normal.         Judgment: Judgment normal.          Last labs reviewed. ASSESSMENT & PLAN :   Problem List        Circulatory    Essential hypertension - Primary     Blood pressures are stable. Continue medications and monitor blood pressures at home. Call office if systolics are over 974 over diastolics over 90. Relevant Medications    lisinopril (PRINIVIL;ZESTRIL) 10 MG tablet    metoprolol tartrate (LOPRESSOR) 50 MG tablet       Musculoskeletal and Integument    Plantar fasciitis of left foot       continue ibuprofen as needed. Try ice massage to the area. Refer to Dr. Perez Diana for further evaluation and treatment. Relevant Orders    Ambulatory referral to Podiatry       Other    Need for influenza vaccination       quadrivalent flu shot given         Relevant Orders    INFLUENZA, MDCK QUADV, 2 YRS AND OLDER, IM, PF, PREFILL SYR OR SDV, 0.5ML (FLUCELVAX QUADV, PF) (Completed)    Other hyperlipidemia       watch diet and will monitor lipids.   I think with weight loss that should normalize         Relevant Medications    lisinopril (PRINIVIL;ZESTRIL) 10 MG tablet    metoprolol tartrate (LOPRESSOR) 50 MG tablet    Anxiety    Relevant Medications    ALPRAZolam (XANAX) 0.25 MG tablet    mirtazapine (REMERON) 15 MG tablet    citalopram (CELEXA) 20 MG tablet    busPIRone (BUSPAR) 7.5 MG tablet    Morbid obesity with BMI of 50.0-59.9, adult [facility])       referred to Dr. Juan Francisco Parikh for evaluation regarding either bariatric surgery or other type of weight loss regimen. Await his recommendations         Relevant Orders    Anai Marie MD, Bariatrics, [facility]           Return in about 3 months (around 12/29/2021), or htn.        Jaimie Jeter, DO  9/29/2021

## 2021-09-29 ENCOUNTER — OFFICE VISIT (OUTPATIENT)
Dept: PRIMARY CARE CLINIC | Age: 36
End: 2021-09-29
Payer: MEDICAID

## 2021-09-29 VITALS
WEIGHT: 293 LBS | HEIGHT: 70 IN | OXYGEN SATURATION: 96 % | TEMPERATURE: 97.6 F | DIASTOLIC BLOOD PRESSURE: 84 MMHG | BODY MASS INDEX: 41.95 KG/M2 | HEART RATE: 89 BPM | SYSTOLIC BLOOD PRESSURE: 122 MMHG

## 2021-09-29 DIAGNOSIS — Z23 NEED FOR INFLUENZA VACCINATION: ICD-10-CM

## 2021-09-29 DIAGNOSIS — F41.9 ANXIETY: ICD-10-CM

## 2021-09-29 DIAGNOSIS — E66.01 MORBID OBESITY WITH BMI OF 50.0-59.9, ADULT (HCC): ICD-10-CM

## 2021-09-29 DIAGNOSIS — I10 ESSENTIAL HYPERTENSION: Primary | ICD-10-CM

## 2021-09-29 DIAGNOSIS — Z23 FLU VACCINE NEED: ICD-10-CM

## 2021-09-29 DIAGNOSIS — M72.2 PLANTAR FASCIITIS OF LEFT FOOT: ICD-10-CM

## 2021-09-29 DIAGNOSIS — E78.49 OTHER HYPERLIPIDEMIA: ICD-10-CM

## 2021-09-29 DIAGNOSIS — E66.01 MORBID OBESITY WITH BODY MASS INDEX (BMI) OF 50.0 TO 59.9 IN ADULT (HCC): ICD-10-CM

## 2021-09-29 PROBLEM — H92.01 RIGHT EAR PAIN: Status: RESOLVED | Noted: 2021-09-08 | Resolved: 2021-09-29

## 2021-09-29 PROBLEM — H60.311 ACUTE DIFFUSE OTITIS EXTERNA OF RIGHT EAR: Status: RESOLVED | Noted: 2021-09-08 | Resolved: 2021-09-29

## 2021-09-29 PROBLEM — H66.011 NON-RECURRENT ACUTE SUPPURATIVE OTITIS MEDIA OF RIGHT EAR WITH SPONTANEOUS RUPTURE OF TYMPANIC MEMBRANE: Status: RESOLVED | Noted: 2021-09-08 | Resolved: 2021-09-29

## 2021-09-29 PROCEDURE — 99213 OFFICE O/P EST LOW 20 MIN: CPT | Performed by: INTERNAL MEDICINE

## 2021-09-29 PROCEDURE — G8427 DOCREV CUR MEDS BY ELIG CLIN: HCPCS | Performed by: INTERNAL MEDICINE

## 2021-09-29 PROCEDURE — 90674 CCIIV4 VAC NO PRSV 0.5 ML IM: CPT | Performed by: INTERNAL MEDICINE

## 2021-09-29 PROCEDURE — G8417 CALC BMI ABV UP PARAM F/U: HCPCS | Performed by: INTERNAL MEDICINE

## 2021-09-29 PROCEDURE — 90471 IMMUNIZATION ADMIN: CPT | Performed by: INTERNAL MEDICINE

## 2021-09-29 PROCEDURE — 4004F PT TOBACCO SCREEN RCVD TLK: CPT | Performed by: INTERNAL MEDICINE

## 2021-09-29 RX ORDER — METOPROLOL TARTRATE 50 MG/1
75 TABLET, FILM COATED ORAL 2 TIMES DAILY
Qty: 135 TABLET | Refills: 1 | Status: SHIPPED
Start: 2021-09-29 | End: 2021-12-30 | Stop reason: SDUPTHER

## 2021-09-29 RX ORDER — FLUCONAZOLE 150 MG/1
TABLET ORAL
COMMUNITY
Start: 2021-09-28 | End: 2021-12-07 | Stop reason: ALTCHOICE

## 2021-09-29 RX ORDER — LISINOPRIL 10 MG/1
10 TABLET ORAL DAILY
Qty: 30 TABLET | Refills: 5 | Status: SHIPPED
Start: 2021-09-29 | End: 2021-12-30 | Stop reason: SDUPTHER

## 2021-09-29 ASSESSMENT — ENCOUNTER SYMPTOMS
COUGH: 0
VOMITING: 0
CONSTIPATION: 0
ABDOMINAL PAIN: 0
BACK PAIN: 0
FACIAL SWELLING: 0
ANAL BLEEDING: 0
DIARRHEA: 0
WHEEZING: 0
STRIDOR: 0
EYE ITCHING: 0
RHINORRHEA: 0
BLOOD IN STOOL: 0
PHOTOPHOBIA: 0
EYE DISCHARGE: 0
EYE PAIN: 0
COLOR CHANGE: 0
SORE THROAT: 0
NAUSEA: 0
TROUBLE SWALLOWING: 0

## 2021-09-29 NOTE — ASSESSMENT & PLAN NOTE
Blood pressures are stable. Continue medications and monitor blood pressures at home. Call office if systolics are over 562 over diastolics over 90.

## 2021-09-29 NOTE — ASSESSMENT & PLAN NOTE
continue ibuprofen as needed. Try ice massage to the area. Refer to Dr. Eleanor Cottrell for further evaluation and treatment.

## 2021-09-29 NOTE — PATIENT INSTRUCTIONS
Patient Education        Influenza (Flu) Vaccine (Inactivated or Recombinant): What You Need to Know  Why get vaccinated? Influenza vaccine can prevent influenza (flu). Flu is a contagious disease that spreads around the [place] every year, usually between October and May. Anyone can get the flu, but it is more dangerous for some people. Infants and young children, people 72years of age and older, pregnant women, and people with certain health conditions or a weakened immune system are at greatest risk of flu complications. Pneumonia, bronchitis, sinus infections and ear infections are examples of flu-related complications. If you have a medical condition, such as heart disease, cancer or diabetes, flu can make it worse. Flu can cause fever and chills, sore throat, muscle aches, fatigue, cough, headache, and runny or stuffy nose. Some people may have vomiting and diarrhea, though this is more common in children than adults. Each year, thousands of people in the [facility] die from flu, and many more are hospitalized. Flu vaccine prevents millions of illnesses and flu-related visits to the doctor each year. Influenza vaccine  CDC recommends everyone 10months of age and older get vaccinated every flu season. Children 6 months through 6years of age may need 2 doses during a single flu season. Everyone else needs only 1 dose each flu season. It takes about 2 weeks for protection to develop after vaccination. There are many flu viruses, and they are always changing. Each year a new flu vaccine is made to protect against three or four viruses that are likely to cause disease in the upcoming flu season. Even when the vaccine doesn't exactly match these viruses, it may still provide some protection. Influenza vaccine does not cause flu. Influenza vaccine may be given at the same time as other vaccines.   Talk with your health care provider  Tell your vaccine provider if the person getting the vaccine:  · Has had an allergic reaction after a previous dose of influenza vaccine, or has any severe, life-threatening allergies. · Has ever had Guillain-Barré Syndrome (also called GBS). In some cases, your health care provider may decide to postpone influenza vaccination to a future visit. People with minor illnesses, such as a cold, may be vaccinated. People who are moderately or severely ill should usually wait until they recover before getting influenza vaccine. Your health care provider can give you more information. Risks of a vaccine reaction  · Soreness, redness, and swelling where shot is given, fever, muscle aches, and headache can happen after influenza vaccine. · There may be a very small increased risk of Guillain-Barré Syndrome (GBS) after inactivated influenza vaccine (the flu shot). Gaybarrieen Nim children who get the flu shot along with pneumococcal vaccine (PCV13), and/or DTaP vaccine at the same time might be slightly more likely to have a seizure caused by fever. Tell your health care provider if a child who is getting flu vaccine has ever had a seizure. People sometimes faint after medical procedures, including vaccination. Tell your provider if you feel dizzy or have vision changes or ringing in the ears. As with any medicine, there is a very remote chance of a vaccine causing a severe allergic reaction, other serious injury, or death. What if there is a serious problem? An allergic reaction could occur after the vaccinated person leaves the clinic. If you see signs of a severe allergic reaction (hives, swelling of the face and throat, difficulty breathing, a fast heartbeat, dizziness, or weakness), call 9-1-1 and get the person to the nearest hospital.  For other signs that concern you, call your health care provider. Adverse reactions should be reported to the Vaccine Adverse Event Reporting System (VAERS).  Your health care provider will usually file this report, or you can do it yourself. Visit the VAERS website at www.vaers. hhs.gov or call 4-247.127.9082. VAERS is only for reporting reactions, and VAERS staff do not give medical advice. The National Vaccine Injury Compensation Program  The National Vaccine Injury Compensation Program (VICP) is a federal program that was created to compensate people who may have been injured by certain vaccines. Visit the VICP website at www.hrsa.gov/vaccinecompensation or call 7-588.506.1380 to learn about the program and about filing a claim. There is a time limit to file a claim for compensation. How can I learn more? · Ask your healthcare provider. · Call your local or state health department. · Contact the Centers for Disease Control and Prevention (CDC):  ? Call 5-602.754.3347 (1-800-CDC-INFO) or  ? Visit CDC's website at www.cdc.gov/flu  Vaccine Information Statement (Interim)  Inactivated Influenza Vaccine  8/15/2019  42 TOMMYCarla Lezama [id number]-[id number]  Department of Health and Human Services  Centers for Disease Control and Prevention  Many Vaccine Information Statements are available in Arabic and other languages. See www.immunize.org/vis. Muchas hojas de información sobre vacunas están disponibles en español y en otros idiomas. Visite www.immunize.org/vis. Care instructions adapted under license by [facility] ([facility]). If you have questions about a medical condition or this instruction, always ask your healthcare professional. [place] [address] any warranty or liability for your use of this information.

## 2021-09-29 NOTE — ASSESSMENT & PLAN NOTE
referred to Dr. Humberto Green for evaluation regarding either bariatric surgery or other type of weight loss regimen.   Await his recommendations

## 2021-10-04 ENCOUNTER — OFFICE VISIT (OUTPATIENT)
Dept: PODIATRY | Age: 36
End: 2021-10-04
Payer: MEDICAID

## 2021-10-04 VITALS
DIASTOLIC BLOOD PRESSURE: 74 MMHG | TEMPERATURE: 98.2 F | BODY MASS INDEX: 54.24 KG/M2 | WEIGHT: 293 LBS | SYSTOLIC BLOOD PRESSURE: 138 MMHG

## 2021-10-04 DIAGNOSIS — M72.2 PLANTAR FASCIAL FIBROMATOSIS: Primary | ICD-10-CM

## 2021-10-04 DIAGNOSIS — M77.31 CALCANEAL SPUR OF BOTH FEET: ICD-10-CM

## 2021-10-04 DIAGNOSIS — M79.675 PAIN IN LEFT TOE(S): ICD-10-CM

## 2021-10-04 DIAGNOSIS — M77.32 CALCANEAL SPUR OF BOTH FEET: ICD-10-CM

## 2021-10-04 PROCEDURE — 4004F PT TOBACCO SCREEN RCVD TLK: CPT | Performed by: PODIATRIST

## 2021-10-04 PROCEDURE — G8417 CALC BMI ABV UP PARAM F/U: HCPCS | Performed by: PODIATRIST

## 2021-10-04 PROCEDURE — 20550 NJX 1 TENDON SHEATH/LIGAMENT: CPT | Performed by: PODIATRIST

## 2021-10-04 PROCEDURE — 99203 OFFICE O/P NEW LOW 30 MIN: CPT | Performed by: PODIATRIST

## 2021-10-04 PROCEDURE — G8427 DOCREV CUR MEDS BY ELIG CLIN: HCPCS | Performed by: PODIATRIST

## 2021-10-04 PROCEDURE — G8482 FLU IMMUNIZE ORDER/ADMIN: HCPCS | Performed by: PODIATRIST

## 2021-10-04 RX ORDER — BETAMETHASONE SODIUM PHOSPHATE AND BETAMETHASONE ACETATE 3; 3 MG/ML; MG/ML
6 INJECTION, SUSPENSION INTRA-ARTICULAR; INTRALESIONAL; INTRAMUSCULAR; SOFT TISSUE ONCE
Status: COMPLETED | OUTPATIENT
Start: 2021-10-04 | End: 2021-10-04

## 2021-10-04 RX ORDER — BUPIVACAINE HYDROCHLORIDE 5 MG/ML
1 INJECTION, SOLUTION PERINEURAL ONCE
Status: COMPLETED | OUTPATIENT
Start: 2021-10-04 | End: 2021-10-04

## 2021-10-04 RX ORDER — ETODOLAC 400 MG/1
400 TABLET, FILM COATED ORAL 2 TIMES DAILY
Qty: 120 TABLET | Refills: 0 | Status: SHIPPED
Start: 2021-10-04 | End: 2021-10-20

## 2021-10-04 RX ADMIN — BUPIVACAINE HYDROCHLORIDE 5 MG: 5 INJECTION, SOLUTION PERINEURAL at 11:48

## 2021-10-04 RX ADMIN — BETAMETHASONE SODIUM PHOSPHATE AND BETAMETHASONE ACETATE 6 MG: 3; 3 INJECTION, SUSPENSION INTRA-ARTICULAR; INTRALESIONAL; INTRAMUSCULAR; SOFT TISSUE at 11:48

## 2021-10-04 NOTE — PROGRESS NOTES
4/23/2020    Non-recurrent acute suppurative otitis media of right ear with spontaneous rupture of tympanic membrane 9/8/2021     Past Surgical History:   Procedure Laterality Date    TYMPANOSTOMY TUBE PLACEMENT       Family History   Problem Relation Age of Onset    High Blood Pressure Father     Diabetes Father      Social History     Socioeconomic History    Marital status: Single     Spouse name: Not on file    Number of children: Not on file    Years of education: Not on file    Highest education level: Not on file   Occupational History    Not on file   Tobacco Use    Smoking status: Current Every Day Smoker     Packs/day: 0.50     Types: Cigarettes    Smokeless tobacco: Never Used   Substance and Sexual Activity    Alcohol use: Not on file    Drug use: Not on file    Sexual activity: Not on file   Other Topics Concern    Not on file   Social History Narrative    Not on file     Social Determinants of Health     Financial Resource Strain: Low Risk     Difficulty of Paying Living Expenses: Not hard at all   Food Insecurity: No Food Insecurity    Worried About Running Out of Food in the Last Year: Never true    [place] of Food in the Last Year: Never true   Transportation Needs:     Lack of Transportation (Medical):      Lack of Transportation (Non-Medical):    Physical Activity:     Days of Exercise per Week:     Minutes of Exercise per Session:    Stress:     Feeling of Stress :    Social Connections:     Frequency of Communication with Friends and Family:     Frequency of Social Gatherings with Friends and Family:     Attends [employer] Services:     Active Member of Clubs or Organizations:     Attends Club or Organization Meetings:     Marital Status:    Intimate Partner Violence:     Fear of Current or Ex-Partner:     Emotionally Abused:     Physically Abused:     Sexually Abused:        Vitals:    10/04/21 0938   BP: 138/74   Temp: 98.2 °F (36.8 °C)   TempSrc: Temporal Weight: (!) 378 lb (171.5 kg)       Focused Lower Extremity Physical Exam:  Vitals:    10/04/21 0938   BP: 138/74   Temp: 98.2 °F (36.8 °C)        Foot Exam    General  General Appearance: appears stated age and healthy   Orientation: alert and oriented to person, place, and time       Left Foot/Ankle      Inspection and Palpation  Tenderness: bony tenderness, plantar fascia and calcaneus tenderness   Skin Exam: skin intact; Neurovascular  Dorsalis pedis: 3+  Posterior tibial: 3+  Saphenous nerve sensation: normal  Tibial nerve sensation: normal  Superficial peroneal nerve sensation: normal  Deep peroneal nerve sensation: normal  Sural nerve sensation: normal  Achilles reflex: 2+  Babinski reflex: 2+    Muscle Strength  Ankle dorsiflexion: 5  Ankle plantar flexion: 5  Ankle inversion: 5  Ankle eversion: 5  Great toe extension: 5  Great toe flexion: 5    Range of Motion    Normal left ankle ROM             Left Ankle Exam     Range of Motion   The patient has normal left ankle ROM. Muscle Strength   The patient has normal left ankle strength. Dorsiflexion:  5/5   Plantar flexion:  5/5             Vascular: pulses  dp  pt +2/4 DP and PT bilaterally  Capillary Refill Time:   Hair growth  Skin:    Edema:    Neurologic:      Musculoskeletal/ Orthopedic examination: Pain with palpation to the inferior aspect of the left heel pain with palpation at the insertion of the plantar fascial muscle calcaneus  NAIL   Web space  Derm:      XR FOOT LEFT (MIN 3 VIEWS)    Result Date: 10/4/2021  EXAMINATION: THREE XRAY VIEWS OF THE LEFT FOOT 10/4/2021 8:40 am COMPARISON: None. HISTORY: ORDERING SYSTEM PROVIDED HISTORY: Plantar fascial fibromatosis TECHNOLOGIST PROVIDED HISTORY: Reason for exam:->wb FINDINGS: There is cortical irregularity of the 4th metatarsal, which may be a chronic finding if there has been no acute trauma.   There are osteoarthritic changes of the midfoot, with joint space narrowing and osteophyte formation. There is a 5 mm plantar calcaneal enthesophyte. No radiopaque foreign body. Osteoarthritic changes of the midfoot. 5 mm plantar calcaneal enthesophyte. Assessment and Plan: Potential risks, complications, alternative treatments, and procedure prognosis were explained to the patient. Verbal informed consent was obtained from the patient. Chlora prep preparation was performed over plantar fascia. 1 mL of 0.5% Marcaine plain and 1 cc of Celestone Soluspan left heel injected in standard medial approach plantar fascia. Patient tolerated steroid injection well. Band-Aid applied. The patient was educated on a possible steroid flare and the use of ice with frozen water bottle 10 minutes each night discussed. Continue passive and active range of motion stretches and strengthening bilateral plantar fascia and Achilles tendons. Pt advised  vionic shoes nidia     Michael Hernandez was seen today for established new doctor and foot pain. Diagnoses and all orders for this visit:    Plantar fascial fibromatosis  -     XR FOOT LEFT (MIN 3 VIEWS); Future    Calcaneal spur of both feet    Pain in left toe(s)    Other orders  -     etodolac (LODINE) 400 MG tablet; Take 1 tablet by mouth 2 times daily  -     betamethasone acetate-betamethasone sodium phosphate (CELESTONE) injection 6 mg  -     bupivacaine (MARCAINE) 0.5 % injection 5 mg        Return in about 2 weeks (around 10/18/2021). Seen By:  Jason Quiroz DPM      Document was created using voice recognition software. Note was reviewed, however may contain grammatical errors.

## 2021-10-20 ENCOUNTER — OFFICE VISIT (OUTPATIENT)
Dept: PODIATRY | Age: 36
End: 2021-10-20
Payer: MEDICAID

## 2021-10-20 VITALS — HEIGHT: 70 IN | BODY MASS INDEX: 41.95 KG/M2 | WEIGHT: 293 LBS

## 2021-10-20 DIAGNOSIS — M72.2 PLANTAR FASCIAL FIBROMATOSIS: Primary | ICD-10-CM

## 2021-10-20 DIAGNOSIS — M77.31 CALCANEAL SPUR OF BOTH FEET: ICD-10-CM

## 2021-10-20 DIAGNOSIS — M79.675 PAIN IN LEFT TOE(S): ICD-10-CM

## 2021-10-20 DIAGNOSIS — M77.32 CALCANEAL SPUR OF BOTH FEET: ICD-10-CM

## 2021-10-20 PROCEDURE — G8482 FLU IMMUNIZE ORDER/ADMIN: HCPCS | Performed by: PODIATRIST

## 2021-10-20 PROCEDURE — G8427 DOCREV CUR MEDS BY ELIG CLIN: HCPCS | Performed by: PODIATRIST

## 2021-10-20 PROCEDURE — G8417 CALC BMI ABV UP PARAM F/U: HCPCS | Performed by: PODIATRIST

## 2021-10-20 PROCEDURE — 99213 OFFICE O/P EST LOW 20 MIN: CPT | Performed by: PODIATRIST

## 2021-10-20 PROCEDURE — 4004F PT TOBACCO SCREEN RCVD TLK: CPT | Performed by: PODIATRIST

## 2021-10-20 NOTE — PROGRESS NOTES
10/20/21  Dunstan Flash : 1985 Sex: female  Age: 37 y.o. Patient was referred by Marla Laughlin DO    Chief Complaint   Patient presents with    Follow-up     left foot injection. Patient states the injection helped. LOV with pcp 2021       SUBJECTIVE seen today for follow-up of left heel pain patient at this time had an injection bionic shoes night splint icing and stretching she is 100% better. HPI  Review of Systems  Const: Denies constitutional symptoms  Musculo: Denies symptoms other than stated above  Skin: Denies symptoms other than stated above       Current Outpatient Medications:     fluconazole (DIFLUCAN) 150 MG tablet, , Disp: , Rfl:     lisinopril (PRINIVIL;ZESTRIL) 10 MG tablet, Take 1 tablet by mouth daily, Disp: 30 tablet, Rfl: 5    metoprolol tartrate (LOPRESSOR) 50 MG tablet, Take 1.5 tablets by mouth 2 times daily, Disp: 135 tablet, Rfl: 1    mirtazapine (REMERON) 15 MG tablet, TAKE ONE TABLET BY MOUTH AT BEDTIME, Disp: , Rfl:     citalopram (CELEXA) 20 MG tablet, TAKE ONE TABLET BY MOUTH ONCE DAILY, Disp: , Rfl:     busPIRone (BUSPAR) 7.5 MG tablet, TAKE ONE TABLET BY MOUTH TWO TIMES A DAY, Disp: , Rfl:     ALPRAZolam (XANAX) 0.25 MG tablet, , Disp: , Rfl:     gabapentin (NEURONTIN) 300 MG capsule, TAKE 1 CAPSULE BY MOUTH TWICE DAILY (Patient taking differently: 300 mg 3 times daily.  TAKE 1 CAPSULE BY MOUTH TWICE DAILY), Disp: 60 capsule, Rfl: 5    medroxyPROGESTERone (DEPO-PROVERA) 150 MG/ML injection, Inject 150 mg into the muscle every 3 months, Disp: , Rfl:   Allergies   Allergen Reactions    No Known Allergies        Past Medical History:   Diagnosis Date    Acute diffuse otitis externa of right ear 2021    Dental abscess 2020    Non-recurrent acute suppurative otitis media of right ear with spontaneous rupture of tympanic membrane 2021     Past Surgical History:   Procedure Laterality Date    TYMPANOSTOMY TUBE PLACEMENT       Family History   Problem Relation Age of Onset    High Blood Pressure Father     Diabetes Father      Social History     Socioeconomic History    Marital status: Single     Spouse name: Not on file    Number of children: Not on file    Years of education: Not on file    Highest education level: Not on file   Occupational History    Not on file   Tobacco Use    Smoking status: Current Every Day Smoker     Packs/day: 0.50     Types: Cigarettes    Smokeless tobacco: Never Used   Substance and Sexual Activity    Alcohol use: Not on file    Drug use: Not on file    Sexual activity: Not on file   Other Topics Concern    Not on file   Social History Narrative    Not on file     Social Determinants of Health     Financial Resource Strain: Low Risk     Difficulty of Paying Living Expenses: Not hard at all   Food Insecurity: No Food Insecurity    Worried About [address] in the Last Year: Never true    [address] [employer] in the Last Year: Never true   Transportation Needs:     Lack of Transportation (Medical):  Lack of Transportation (Non-Medical):    Physical Activity:     Days of Exercise per Week:     Minutes of Exercise per Session:    Stress:     Feeling of Stress :    Social Connections:     Frequency of Communication with Friends and Family:     Frequency of Social Gatherings with Friends and Family:     Attends L[employer] Services:     Active Member of Clubs or Organizations:     Attends Club or Organization Meetings:     Marital Status:    Intimate Partner Violence:     Fear of Current or Ex-Partner:     Emotionally Abused:     Physically Abused:     Sexually Abused:        Vitals:    10/20/21 1018   Weight: (!) 378 lb (171.5 kg)   Height: 5' 10\" (1.778 m)       Focused Lower Extremity Physical Exam:  There were no vitals filed for this visit.      Foot Exam    General  General Appearance: appears stated age and healthy   Orientation: alert and oriented to person, place, and time Right Foot/Ankle     Inspection and Palpation  Ecchymosis: none  Swelling: none   Skin Exam: skin intact;     Muscle Strength  Ankle dorsiflexion: 5  Ankle plantar flexion: 5  Ankle inversion: 5  Ankle eversion: 5  Great toe extension: 5  Great toe flexion: 5    Range of Motion    Normal right ankle ROM      Left Foot/Ankle      Inspection and Palpation  Ecchymosis: none  Tenderness: none   Swelling: none   Skin Exam: callus; Neurovascular  Dorsalis pedis: 3+  Posterior tibial: 3+  Saphenous nerve sensation: normal  Tibial nerve sensation: normal  Superficial peroneal nerve sensation: normal  Deep peroneal nerve sensation: normal  Sural nerve sensation: normal  Achilles reflex: 2+  Babinski reflex: 2+    Muscle Strength  Ankle dorsiflexion: 5  Ankle plantar flexion: 5  Ankle inversion: 5  Ankle eversion: 5  Great toe extension: 5  Great toe flexion: 5             Right Ankle Exam     Range of Motion   The patient has normal right ankle ROM. Muscle Strength   Dorsiflexion:  5/5  Plantar flexion:  5/5      Left Ankle Exam     Muscle Strength   The patient has normal left ankle strength. Dorsiflexion:  5/5   Plantar flexion:  5/5             Vascular: pulses  dp  pt +2/4 DP and PT  Capillary Refill Time:   Hair growth  Skin:    Edema:    Neurologic:      Musculoskeletal/ Orthopedic examination:    NAIL   Web space   Derm:         Assessment and Plan: Patient is to continue doing a night splint stretching icing for 2 weeks reappoint as needed  [facility] was seen today for follow-up. Diagnoses and all orders for this visit:    Plantar fascial fibromatosis    Calcaneal spur of both feet    Pain in left toe(s)        No follow-ups on file. Seen By:  Chevis Hodgkin, DPM      Document was created using voice recognition software. Note was reviewed, however may contain grammatical errors.

## 2021-10-27 ENCOUNTER — OFFICE VISIT (OUTPATIENT)
Dept: ENT CLINIC | Age: 36
End: 2021-10-27
Payer: MEDICAID

## 2021-10-27 ENCOUNTER — PROCEDURE VISIT (OUTPATIENT)
Dept: AUDIOLOGY | Age: 36
End: 2021-10-27
Payer: MEDICAID

## 2021-10-27 VITALS
RESPIRATION RATE: 16 BRPM | BODY MASS INDEX: 54.24 KG/M2 | SYSTOLIC BLOOD PRESSURE: 118 MMHG | OXYGEN SATURATION: 96 % | HEART RATE: 86 BPM | WEIGHT: 293 LBS | DIASTOLIC BLOOD PRESSURE: 90 MMHG

## 2021-10-27 DIAGNOSIS — H65.491 CHRONIC OTITIS MEDIA OF RIGHT EAR WITH EFFUSION: Primary | ICD-10-CM

## 2021-10-27 DIAGNOSIS — H69.81 DYSFUNCTION OF RIGHT EUSTACHIAN TUBE: ICD-10-CM

## 2021-10-27 DIAGNOSIS — H65.491 COME (CHRONIC OTITIS MEDIA WITH EFFUSION), RIGHT: Primary | ICD-10-CM

## 2021-10-27 PROCEDURE — G8417 CALC BMI ABV UP PARAM F/U: HCPCS | Performed by: NURSE PRACTITIONER

## 2021-10-27 PROCEDURE — G8482 FLU IMMUNIZE ORDER/ADMIN: HCPCS | Performed by: NURSE PRACTITIONER

## 2021-10-27 PROCEDURE — 92567 TYMPANOMETRY: CPT | Performed by: AUDIOLOGIST

## 2021-10-27 PROCEDURE — 4004F PT TOBACCO SCREEN RCVD TLK: CPT | Performed by: NURSE PRACTITIONER

## 2021-10-27 PROCEDURE — 99213 OFFICE O/P EST LOW 20 MIN: CPT | Performed by: NURSE PRACTITIONER

## 2021-10-27 PROCEDURE — G8427 DOCREV CUR MEDS BY ELIG CLIN: HCPCS | Performed by: NURSE PRACTITIONER

## 2021-10-27 RX ORDER — AZELASTINE 1 MG/ML
2 SPRAY, METERED NASAL 2 TIMES DAILY
Qty: 30 ML | Refills: 1 | Status: SHIPPED
Start: 2021-10-27 | End: 2021-12-30

## 2021-10-27 RX ORDER — METHYLPREDNISOLONE 4 MG/1
4 TABLET ORAL SEE ADMIN INSTRUCTIONS
Qty: 1 KIT | Refills: 0 | Status: SHIPPED | OUTPATIENT
Start: 2021-10-27 | End: 2021-11-02

## 2021-10-27 NOTE — PROGRESS NOTES
[facility] Otolaryngology  Dr. Sherron Perez. Hipolito Romero. Ms.Ed        Patient Name:  Lilli Naylor  :  1985     CHIEF C/O:    Chief Complaint   Patient presents with    Ear Problem     right ear rupture not better with treatment        HISTORY OBTAINED FROM:  patient    HISTORY OF PRESENT ILLNESS:       Carlos Manuel Elizabeth is a 37y.o. year old female, here today for follow up of right ear hearing loss. Last seen 1 month ago  Cerumen removed from right ear  Treated for otitis externa and right middle ear effusion  Completed cipro drops and has used flonase daily for 6 weeks  No improvement to hearing  No current pain or draiange  Muffled hearing remains, no ringing  No dizziness  No current sinus congestion, rhinorrhea or post nasal drainage. Past Medical History:   Diagnosis Date    Acute diffuse otitis externa of right ear 2021    Dental abscess 2020    Non-recurrent acute suppurative otitis media of right ear with spontaneous rupture of tympanic membrane 2021     Past Surgical History:   Procedure Laterality Date    TYMPANOSTOMY TUBE PLACEMENT         Current Outpatient Medications:     fluconazole (DIFLUCAN) 150 MG tablet, , Disp: , Rfl:     lisinopril (PRINIVIL;ZESTRIL) 10 MG tablet, Take 1 tablet by mouth daily, Disp: 30 tablet, Rfl: 5    metoprolol tartrate (LOPRESSOR) 50 MG tablet, Take 1.5 tablets by mouth 2 times daily, Disp: 135 tablet, Rfl: 1    mirtazapine (REMERON) 15 MG tablet, TAKE ONE TABLET BY MOUTH AT BEDTIME, Disp: , Rfl:     citalopram (CELEXA) 20 MG tablet, TAKE ONE TABLET BY MOUTH ONCE DAILY, Disp: , Rfl:     busPIRone (BUSPAR) 7.5 MG tablet, TAKE ONE TABLET BY MOUTH TWO TIMES A DAY, Disp: , Rfl:     ALPRAZolam (XANAX) 0.25 MG tablet, , Disp: , Rfl:     gabapentin (NEURONTIN) 300 MG capsule, TAKE 1 CAPSULE BY MOUTH TWICE DAILY (Patient taking differently: 300 mg 3 times daily.  TAKE 1 CAPSULE BY MOUTH TWICE DAILY), Disp: 60 capsule, Rfl: 5    medroxyPROGESTERone (DEPO-PROVERA) 150 MG/ML injection, Inject 150 mg into the muscle every 3 months, Disp: , Rfl:   No known allergies  Social History     Tobacco Use    Smoking status: Current Every Day Smoker     Packs/day: 0.50     Types: Cigarettes    Smokeless tobacco: Never Used   Substance Use Topics    Alcohol use: Not on file    Drug use: Not on file     Family History   Problem Relation Age of Onset    High Blood Pressure Father     Diabetes Father        Review of Systems   Constitutional: Negative. Negative for activity change and appetite change. HENT: Positive for ear pain, hearing loss and tinnitus (Pulsatile). Negative for congestion, ear discharge, postnasal drip, rhinorrhea, sinus pressure and sinus pain. Eyes: Negative. Respiratory: Negative. Negative for shortness of breath and stridor. Cardiovascular: Negative. Negative for chest pain and palpitations. Endocrine: Negative. Musculoskeletal: Negative. Skin: Negative. Neurological: Negative. Negative for dizziness. Hematological: Negative. Psychiatric/Behavioral: Negative. BP (!) 118/90 (Site: Left Upper Arm, Position: Sitting, Cuff Size: Large Adult)   Pulse 86   Resp 16   Wt (!) 378 lb (171.5 kg)   SpO2 96%   BMI 54.24 kg/m²   Physical Exam  Constitutional:       Appearance: Normal appearance. She is obese. HENT:      Head: Normocephalic. Right Ear: Ear canal and external ear normal. Tympanic membrane is retracted. Left Ear: Tympanic membrane, ear canal and external ear normal.      Nose: Nose normal. No rhinorrhea. Right Turbinates: Not pale. Left Turbinates: Not pale. Mouth/Throat:      Lips: Pink. Mouth: Mucous membranes are moist.      Pharynx: Oropharynx is clear. Eyes:      Conjunctiva/sclera: Conjunctivae normal.      Pupils: Pupils are equal, round, and reactive to light. Cardiovascular:      Rate and Rhythm: Normal rate and regular rhythm. Pulses: Normal pulses. Pulmonary:      Effort: Pulmonary effort is normal. No respiratory distress. Breath sounds: No stridor. Musculoskeletal:         General: Normal range of motion. Cervical back: Normal range of motion. No rigidity. No muscular tenderness. Skin:     General: Skin is warm and dry. Neurological:      General: No focal deficit present. Mental Status: She is alert and oriented to person, place, and time. Psychiatric:         Mood and Affect: Mood normal.         Behavior: Behavior normal.         Thought Content: Thought content normal.         Judgment: Judgment normal.           Tympanogram reviewed with patient. Reveals type Flat curve in the right ear, with type A curve in the left ear. IMPRESSION/PLAN:    Jillian Winchester was seen today for ear problem. Diagnoses and all orders for this visit:    COME (chronic otitis media with effusion), right  -     Tympanometry; Future    Dysfunction of right eustachian tube    Other orders  -     methylPREDNISolone (MEDROL DOSEPACK) 4 MG tablet; Take 1 tablet by mouth See Admin Instructions for 6 days Take by mouth.  -     azelastine (ASTELIN) 0.1 % nasal spray; 2 sprays by Nasal route 2 times daily Use in each nostril as directed      Patient will continue with Flonase, 2 sprays each nostril once daily. She is instructed to for use at this time to look towards the floor and spray in an upward direction. After spraying she has to blow out as if she is trying to pop her ears. She will also be placed on Astelin spray, 2 sprays each nostril once daily and a Medrol dose pack. Also recommended for patient to begin using Sudafed 30 mg every 4 to 6 hours as needed for the following 2 weeks and attempt to decongest the eustachian tube. She will otherwise follow-up in 1 month. She is instructed to call with any new or worsening symptoms prior to her next appointment.         Elizabeth White, MSN, FNP-C  8 [facility], Nose and Throat    The information contained in this note has been dictated using drug and medical speech recognition software and may contain errors

## 2021-10-27 NOTE — PROGRESS NOTES
This patient was referred for audiometric/tympanometric testing by SAMMIE Torrez due to history of ear infections, on the right. Tympanometry revealed a flat tympanogram, in the right ear and normal middle ear peak pressure and compliance, in the left ear. The results were reviewed with the patient. Recommendations for follow up will be made pending physician consult.     Kenneth Keller [facility]  [address] [facility] [id number]  Electronically signed by Kenneth Keller on 10/27/2021 at 5:04 PM

## 2021-10-29 PROBLEM — Z23 NEED FOR INFLUENZA VACCINATION: Status: RESOLVED | Noted: 2019-11-11 | Resolved: 2021-10-29

## 2021-11-04 ASSESSMENT — ENCOUNTER SYMPTOMS
RESPIRATORY NEGATIVE: 1
RHINORRHEA: 0
STRIDOR: 0
SINUS PRESSURE: 0
EYES NEGATIVE: 1
SHORTNESS OF BREATH: 0
SINUS PAIN: 0

## 2021-12-07 ENCOUNTER — OFFICE VISIT (OUTPATIENT)
Dept: BARIATRICS/WEIGHT MGMT | Age: 36
End: 2021-12-07
Payer: MEDICAID

## 2021-12-07 VITALS
BODY MASS INDEX: 43.4 KG/M2 | DIASTOLIC BLOOD PRESSURE: 72 MMHG | WEIGHT: 293 LBS | SYSTOLIC BLOOD PRESSURE: 124 MMHG | TEMPERATURE: 97.7 F | HEART RATE: 76 BPM | HEIGHT: 69 IN

## 2021-12-07 DIAGNOSIS — I10 PRIMARY HYPERTENSION: Primary | ICD-10-CM

## 2021-12-07 DIAGNOSIS — E66.01 CLASS 3 SEVERE OBESITY DUE TO EXCESS CALORIES WITHOUT SERIOUS COMORBIDITY WITH BODY MASS INDEX (BMI) OF 50.0 TO 59.9 IN ADULT (HCC): ICD-10-CM

## 2021-12-07 PROBLEM — E66.813 CLASS 3 SEVERE OBESITY DUE TO EXCESS CALORIES WITHOUT SERIOUS COMORBIDITY WITH BODY MASS INDEX (BMI) OF 50.0 TO 59.9 IN ADULT: Status: ACTIVE | Noted: 2021-06-23

## 2021-12-07 PROCEDURE — 4004F PT TOBACCO SCREEN RCVD TLK: CPT | Performed by: INTERNAL MEDICINE

## 2021-12-07 PROCEDURE — 99205 OFFICE O/P NEW HI 60 MIN: CPT | Performed by: INTERNAL MEDICINE

## 2021-12-07 PROCEDURE — G8482 FLU IMMUNIZE ORDER/ADMIN: HCPCS | Performed by: INTERNAL MEDICINE

## 2021-12-07 PROCEDURE — G8417 CALC BMI ABV UP PARAM F/U: HCPCS | Performed by: INTERNAL MEDICINE

## 2021-12-07 PROCEDURE — G8428 CUR MEDS NOT DOCUMENT: HCPCS | Performed by: INTERNAL MEDICINE

## 2021-12-07 PROCEDURE — 99202 OFFICE O/P NEW SF 15 MIN: CPT

## 2021-12-07 RX ORDER — TOPIRAMATE 25 MG/1
25 TABLET ORAL 2 TIMES DAILY
Qty: 180 TABLET | Refills: 1 | Status: SHIPPED
Start: 2021-12-07 | End: 2022-02-07 | Stop reason: ALTCHOICE

## 2021-12-07 NOTE — PROGRESS NOTES
CC -   HTN, Obesity    BACKGROUND -   First visit: 12/07/21     Obesity   Began in childhood  Initial BMI 55.3, Wt 371.8 lbs, Ht 5' 8.75\"  HS Grad wt 300 lbs  Lowest   wt 300 lbs  Highest  wt 378 lbs  Pattern of wt gain: grad  Wt change past yr: +18 lbs  Most wt lost: none  Other diets attempted: none    Desire to lose weight: 8-9/10  Problem posed by appetite: 8/10     Initial Diet:    Number of meals per day - 3    Number of snacks per day - 3    Meal volume - 12\" plate, sometimes seconds    Fast food/convenience store - 3x/week    Restaurants (not fast food) - 1x/week   Sweets - 7d/week (1 LD Cosmic Brownie)   Chips - 3d/week (1-2 oz)   Crackers/pretzels - 0d/week   Nuts - 2d/week (two handfuls)   Peanut Butter - 7d/week (one large scoop)   Popcorn - 0d/week   Dried fruit - 0d/week   Whole fruit - 0d/week   Breakfast cereal - 3d/week (Honey nut cheerios, one bowl)   Granola/Protein/Energy bar - 0d/week   Sugar sweetened beverages - 40oz whole or 2% milk/day, 18 oz Gold Peak sweet tea 3-4x/wk (190 pam each)   Protein - No supplements   Fiber - No supplements     Exercise:    Gym membership - none    Walking - none    Running - none    Resistance - none    Aerobic class - none    ______________________    [facility] -  Past Medical History:   Diagnosis Date    Class 3 severe obesity due to excess calories without serious comorbidity with body mass index (BMI) of 50.0 to 59.9 in adult [facility])     Primary hypertension      Current Outpatient Medications   Medication Sig Dispense Refill    azelastine (ASTELIN) 0.1 % nasal spray 2 sprays by Nasal route 2 times daily Use in each nostril as directed 30 mL 1    lisinopril (PRINIVIL;ZESTRIL) 10 MG tablet Take 1 tablet by mouth daily 30 tablet 5    metoprolol tartrate (LOPRESSOR) 50 MG tablet Take 1.5 tablets by mouth 2 times daily 135 tablet 1    mirtazapine (REMERON) 15 MG tablet TAKE ONE TABLET BY MOUTH AT BEDTIME      citalopram (CELEXA) 20 MG tablet TAKE ONE TABLET BY MOUTH ONCE DAILY      busPIRone (BUSPAR) 7.5 MG tablet TAKE ONE TABLET BY MOUTH TWO TIMES A DAY      ALPRAZolam (XANAX) 0.25 MG tablet       gabapentin (NEURONTIN) 300 MG capsule TAKE 1 CAPSULE BY MOUTH TWICE DAILY (Patient taking differently: 300 mg 3 times daily. TAKE 1 CAPSULE BY MOUTH TWICE DAILY) 60 capsule 5    medroxyPROGESTERone (DEPO-PROVERA) 150 MG/ML injection Inject 150 mg into the muscle every 3 months       No current facility-administered medications for this visit. ROS -  Card - no CP  GI - no N/V/D/C    PE -  Gen : /72 (Site: Left Upper Arm, Position: Sitting, Cuff Size: Thigh)   Pulse 76   Temp 97.7 °F (36.5 °C) (Temporal)   Ht 5' 8.75\" (1.746 m)   Wt (!) 371 lb 12.8 oz (168.6 kg)   BMI 55.31 kg/m²    WN, WD, NAD  Heart:  RRR without MGR. No carotid bruits, + LE edema  Lung: Nml resp effort, CTA b/l  Psych: Normal mood   Full affect  Neuro: Moves all ext well  ______________________    HISTORY & ASSESSMENT/PLAN -     Problem 1  - HTN  HPI   - Diagnosed in early 20's      124/72 today      Regimen: lisinopril 10 mg daily, metoprolol 50 mg, 1 1/2 tab twice daily  Assessment  - controlled; reduction of her excess weight will decrease the risk of complications from her high blood pressure  Plan   - wt reduction per the plan below    Problem 2  - Obesity   HPI   - See above Background for description    Weight  Date    371.8 lbs 12/07/21  DEN = 2450 pam/d = 17,150 pam/wk  Wt effect from HR foods = Restaurant food 600 pam/wk + Sweets 1610 + Chips/Nuts 1275 + PB 1400 + CCB 1205 = 6,090 pam/wk = 870 pam/d = 35% DEN = 87 lbs/yr  Does not want surgery  Chose to first see the effect of elimination of HR foods  Would like an appetite suppressant. Too many primary psychiatric medications for bupropion or venlafaxine. Cannot use phentermine b/c of anxiety. Topiramate therefore chosen. Needs to stop Remeron.  Her wt gain this past year after starting it is consistent with what is reported to occur in a significant number of patients. Assessment  - Uncontrolled  Plan   -   Patient Instructions   Limit sweets to one day per month    Limit restaurant food to once every two weeks    Limit chips and nuts to one handful/day    Only eat peanut on another food and only as part of a meal    Do not drink any sugar sweetened beverages other than up to 10 oz of low fat milk/day    Drink at least 96 oz of water daily    Start Topiramate 25 mg. Take one tablet twice each day for two weeks. If the appetite suppression is insufficient after this two weeks, then increase to two tablets twice daily.  If at the end of two weeks on this dose there is still insufficient appetite suppression, reduce the dose to one tablet twice daily for three days and then stop    Discuss stopping Remeron with psychiatrist because of its tendency to cause weight gain    Total time spent on encounter: 65 min    Osmin Gusman MD  Endocrinology/Obesity  12/7/21

## 2021-12-07 NOTE — PATIENT INSTRUCTIONS
Limit sweets to one day per month    Limit restaurant food to once every two weeks    Limit chips and nuts to one handful/day    Only eat peanut on another food and only as part of a meal    Do not drink any sugar sweetened beverages other than up to 10 oz of low fat milk/day    Drink at least 96 oz of water daily    Start Topiramate 25 mg. Take one tablet twice each day for two weeks. If the appetite suppression is insufficient after this two weeks, then increase to two tablets twice daily.  If at the end of two weeks on this dose there is still insufficient appetite suppression, reduce the dose to one tablet twice daily for three days and then stop    Discuss stopping Remeron with psychiatrist because of its tendency to cause weight gain

## 2021-12-14 ENCOUNTER — OFFICE VISIT (OUTPATIENT)
Dept: ENT CLINIC | Age: 36
End: 2021-12-14
Payer: MEDICAID

## 2021-12-14 ENCOUNTER — PROCEDURE VISIT (OUTPATIENT)
Dept: AUDIOLOGY | Age: 36
End: 2021-12-14
Payer: MEDICAID

## 2021-12-14 VITALS — WEIGHT: 293 LBS | BODY MASS INDEX: 41.95 KG/M2 | HEIGHT: 70 IN

## 2021-12-14 DIAGNOSIS — H65.491 COME (CHRONIC OTITIS MEDIA WITH EFFUSION), RIGHT: Primary | ICD-10-CM

## 2021-12-14 DIAGNOSIS — H69.81 DYSFUNCTION OF RIGHT EUSTACHIAN TUBE: ICD-10-CM

## 2021-12-14 DIAGNOSIS — H65.491 COME (CHRONIC OTITIS MEDIA WITH EFFUSION), RIGHT: ICD-10-CM

## 2021-12-14 PROCEDURE — G8417 CALC BMI ABV UP PARAM F/U: HCPCS | Performed by: NURSE PRACTITIONER

## 2021-12-14 PROCEDURE — G8427 DOCREV CUR MEDS BY ELIG CLIN: HCPCS | Performed by: NURSE PRACTITIONER

## 2021-12-14 PROCEDURE — G8482 FLU IMMUNIZE ORDER/ADMIN: HCPCS | Performed by: NURSE PRACTITIONER

## 2021-12-14 PROCEDURE — 4004F PT TOBACCO SCREEN RCVD TLK: CPT | Performed by: NURSE PRACTITIONER

## 2021-12-14 PROCEDURE — 92567 TYMPANOMETRY: CPT | Performed by: AUDIOLOGIST

## 2021-12-14 PROCEDURE — 99214 OFFICE O/P EST MOD 30 MIN: CPT | Performed by: NURSE PRACTITIONER

## 2021-12-14 ASSESSMENT — ENCOUNTER SYMPTOMS
STRIDOR: 0
EYES NEGATIVE: 1
SINUS PRESSURE: 0
RESPIRATORY NEGATIVE: 1
SHORTNESS OF BREATH: 0
RHINORRHEA: 0
SINUS PAIN: 0

## 2021-12-14 NOTE — PROGRESS NOTES
[facility]  Dr. Fish Song. Ms.Ed        Patient Name:  Judy Sandoval  :  1985     CHIEF C/O:    Chief Complaint   Patient presents with    Follow-up     1mo f/u right eardrum rupture       HISTORY OBTAINED FROM:  patient    HISTORY OF PRESENT ILLNESS:       Law Yen is a 31y.o. year old female, here today for follow up of right middle ear effusion. Patient was last seen 6 weeks ago and has continued using Flonase and Astelin spray daily without relief of her right middle ear effusion. Hearing remains muffled in the right ear. Mild pressure in the right ear. Patient does have a history of recurrent middle ear effusions with previous tubes in the past.  She is interested in surgical intervention at this time if available.           Past Medical History:   Diagnosis Date    Anxiety     Class 3 severe obesity due to excess calories without serious comorbidity with body mass index (BMI) of 50.0 to 59.9 in [facility])     Primary hypertension      Past Surgical History:   Procedure Laterality Date    TYMPANOSTOMY TUBE PLACEMENT         Current Outpatient Medications:     topiramate (TOPAMAX) 25 MG tablet, Take 1 tablet by mouth 2 times daily, Disp: 180 tablet, Rfl: 1    azelastine (ASTELIN) 0.1 % nasal spray, 2 sprays by Nasal route 2 times daily Use in each nostril as directed, Disp: 30 mL, Rfl: 1    lisinopril (PRINIVIL;ZESTRIL) 10 MG tablet, Take 1 tablet by mouth daily, Disp: 30 tablet, Rfl: 5    metoprolol tartrate (LOPRESSOR) 50 MG tablet, Take 1.5 tablets by mouth 2 times daily, Disp: 135 tablet, Rfl: 1    mirtazapine (REMERON) 15 MG tablet, TAKE ONE TABLET BY MOUTH AT BEDTIME, Disp: , Rfl:     citalopram (CELEXA) 20 MG tablet, TAKE ONE TABLET BY MOUTH ONCE DAILY, Disp: , Rfl:     busPIRone (BUSPAR) 7.5 MG tablet, TAKE ONE TABLET BY MOUTH TWO TIMES A DAY, Disp: , Rfl:     ALPRAZolam (XANAX) 0.25 MG tablet, , Disp: , Rfl:     gabapentin (NEURONTIN) 300 MG capsule, TAKE 1 CAPSULE BY MOUTH TWICE DAILY (Patient taking differently: 300 mg 3 times daily. TAKE 1 CAPSULE BY MOUTH TWICE DAILY), Disp: 60 capsule, Rfl: 5    medroxyPROGESTERone (DEPO-PROVERA) 150 MG/ML injection, Inject 150 mg into the muscle every 3 months, Disp: , Rfl:   No known allergies  Social History     Tobacco Use    Smoking status: Current Every Day Smoker     Packs/day: 0.50     Types: Cigarettes    Smokeless tobacco: Never Used   Substance Use Topics    Alcohol use: Not on file    Drug use: Not on file     Family History   Problem Relation Age of Onset    High Blood Pressure Father     Diabetes Father        Review of Systems   Constitutional: Negative. Negative for activity change and appetite change. HENT: Positive for ear pain, hearing loss and tinnitus (Pulsatile). Negative for congestion, ear discharge, postnasal drip, rhinorrhea, sinus pressure and sinus pain. Eyes: Negative. Respiratory: Negative. Negative for shortness of breath and stridor. Cardiovascular: Negative. Negative for chest pain and palpitations. Endocrine: Negative. Musculoskeletal: Negative. Skin: Negative. Neurological: Negative. Negative for dizziness. Hematological: Negative. Psychiatric/Behavioral: Negative. Ht 5' 10\" (1.778 m)   Wt (!) 371 lb (168.3 kg)   LMP  (LMP Unknown)   BMI 53.23 kg/m²   Physical Exam  Constitutional:       Appearance: Normal appearance. She is obese. HENT:      Head: Normocephalic. Right Ear: Ear canal and external ear normal. A middle ear effusion is present. Tympanic membrane is retracted. Left Ear: Tympanic membrane, ear canal and external ear normal.      Nose: Nose normal. No rhinorrhea. Right Turbinates: Not pale. Left Turbinates: Not pale. Mouth/Throat:      Lips: Pink. Mouth: Mucous membranes are moist.      Pharynx: Oropharynx is clear.    Eyes:      Conjunctiva/sclera: Conjunctivae normal.      Pupils: Pupils are equal, round, and reactive to light. Cardiovascular:      Rate and Rhythm: Normal rate and regular rhythm. Pulses: Normal pulses. Pulmonary:      Effort: Pulmonary effort is normal. No respiratory distress. Breath sounds: No stridor. Musculoskeletal:         General: Normal range of motion. Cervical back: Normal range of motion. No rigidity. No muscular tenderness. Skin:     General: Skin is warm and dry. Neurological:      General: No focal deficit present. Mental Status: She is alert and oriented to person, place, and time. Psychiatric:         Mood and Affect: Mood normal.         Behavior: Behavior normal.         Thought Content: Thought content normal.         Judgment: Judgment normal.           Tympanogram reviewed with patient. Reveals type Flat curve in the right ear, with type A curve in the left ear. IMPRESSION/PLAN:  Carlos Manuel Elizabeth was seen today for follow-up. Diagnoses and all orders for this visit:    COME (chronic otitis media with effusion), right    Dysfunction of right eustachian tube    Sonia Reddy was seen today for follow-up of recurrent right middle ear effusion and eustachian tube dysfunction. She has made no significant improvement over the course of 3 months of moderate medical therapy. At this time she is interested in surgical intervention requests no sedation. Risks and benefits of myringotomy with tympanostomy tube placement are discussed with the patient who wishes to proceed. She will be scheduled with Dr. Gilda Pinedo at the [facility] office for right myringotomy with tympanostomy tube placement. She will follow-up as directed by Dr. Gilda Pinedo at that time.   Patient is instructed to call with any new or worsening symptoms prior to her next appointment      EMERY Dietrich, FNP-C  [facility], Nose and Throat    The information contained in this note has been dictated using drug and medical speech recognition software and may contain

## 2021-12-14 NOTE — PROGRESS NOTES
This patient was referred for tympanometric testing by SAMMIE Stark due to COME, right ear. Patient has had PE tubes, in the past.      Tympanometry revealed a flat tympanogram, with no middle ear peak pressure, right ear and normal middle ear peak pressure and compliance, left ear. Ipsilateral acoustic reflexes were absent, bilaterally at 1000Hz. The results were reviewed with the patient. Recommendations for follow up will be made pending physician consult.     Pancho Zaragoza CCC/GIOVANNY  Audiologist  [id number]  NPI#:  [id number]

## 2021-12-23 ENCOUNTER — PROCEDURE VISIT (OUTPATIENT)
Dept: ENT CLINIC | Age: 36
End: 2021-12-23
Payer: MEDICAID

## 2021-12-23 VITALS — HEIGHT: 70 IN | BODY MASS INDEX: 41.95 KG/M2 | WEIGHT: 293 LBS | TEMPERATURE: 97 F

## 2021-12-23 DIAGNOSIS — H65.91 MIDDLE EAR EFFUSION, RIGHT: ICD-10-CM

## 2021-12-23 PROCEDURE — 69433 CREATE EARDRUM OPENING: CPT | Performed by: OTOLARYNGOLOGY

## 2021-12-23 RX ORDER — CIPROFLOXACIN AND DEXAMETHASONE 3; 1 MG/ML; MG/ML
3 SUSPENSION/ DROPS AURICULAR (OTIC) 3 TIMES DAILY
Qty: 7.5 ML | Refills: 3 | Status: SHIPPED | OUTPATIENT
Start: 2021-12-23 | End: 2021-12-30

## 2021-12-23 NOTE — PROGRESS NOTES
Procedure Note  Pre-operative Diagnosis: right ear effusion    Post-operative Diagnosis: same    Anesthesia: phenol topical     Procedure Details:    Pt was consented, placed in the supine position and a microscope was brought in and a speculum was placed in the right ear and the external auditory canal was cleared of cerumen with a currette. With the tympanic membrane visualized, there was infection and was effusion present. A small cotton swab dipped in phenol topical solution was placed against the anterior-inferior portion of the TM until the membrane turned white. A myringotomy knife was used to make an incision in the anterior-inferior portion of the TM. Effusion was removed with a #3 júnior tip suction until the middle ear space was cleared. A papparella  tube was place in the incision. Condition:  Stable. Patient tolerated procedure well.     Complications:

## 2021-12-30 ENCOUNTER — OFFICE VISIT (OUTPATIENT)
Dept: PRIMARY CARE CLINIC | Age: 36
End: 2021-12-30
Payer: MEDICAID

## 2021-12-30 VITALS
WEIGHT: 293 LBS | DIASTOLIC BLOOD PRESSURE: 86 MMHG | HEART RATE: 91 BPM | SYSTOLIC BLOOD PRESSURE: 128 MMHG | HEIGHT: 70 IN | TEMPERATURE: 97.2 F | OXYGEN SATURATION: 98 % | BODY MASS INDEX: 41.95 KG/M2

## 2021-12-30 DIAGNOSIS — I10 ESSENTIAL HYPERTENSION: Primary | ICD-10-CM

## 2021-12-30 DIAGNOSIS — F41.9 ANXIETY: ICD-10-CM

## 2021-12-30 DIAGNOSIS — E78.49 OTHER HYPERLIPIDEMIA: ICD-10-CM

## 2021-12-30 DIAGNOSIS — Z23 NEED FOR VACCINATION WITH PVAX (PNEUMOCOCCAL VACCINATION): ICD-10-CM

## 2021-12-30 PROCEDURE — 99214 OFFICE O/P EST MOD 30 MIN: CPT | Performed by: INTERNAL MEDICINE

## 2021-12-30 PROCEDURE — G8482 FLU IMMUNIZE ORDER/ADMIN: HCPCS | Performed by: INTERNAL MEDICINE

## 2021-12-30 PROCEDURE — G8427 DOCREV CUR MEDS BY ELIG CLIN: HCPCS | Performed by: INTERNAL MEDICINE

## 2021-12-30 PROCEDURE — 4004F PT TOBACCO SCREEN RCVD TLK: CPT | Performed by: INTERNAL MEDICINE

## 2021-12-30 PROCEDURE — G8417 CALC BMI ABV UP PARAM F/U: HCPCS | Performed by: INTERNAL MEDICINE

## 2021-12-30 RX ORDER — LISINOPRIL 10 MG/1
10 TABLET ORAL DAILY
Qty: 90 TABLET | Refills: 1 | Status: SHIPPED
Start: 2021-12-30 | End: 2022-06-22 | Stop reason: SDUPTHER

## 2021-12-30 RX ORDER — METOPROLOL TARTRATE 50 MG/1
75 TABLET, FILM COATED ORAL 2 TIMES DAILY
Qty: 270 TABLET | Refills: 1 | Status: SHIPPED
Start: 2021-12-30 | End: 2022-06-22 | Stop reason: SDUPTHER

## 2021-12-30 ASSESSMENT — ENCOUNTER SYMPTOMS
PHOTOPHOBIA: 0
STRIDOR: 0
FACIAL SWELLING: 0
CONSTIPATION: 0
BLOOD IN STOOL: 0
VOMITING: 0
ANAL BLEEDING: 0
WHEEZING: 0
EYE DISCHARGE: 0
RHINORRHEA: 0
COUGH: 0
SHORTNESS OF BREATH: 0
NAUSEA: 0
EYE PAIN: 0
TROUBLE SWALLOWING: 0
ABDOMINAL PAIN: 0
CHEST TIGHTNESS: 0
SORE THROAT: 0
DIARRHEA: 0
EYE ITCHING: 0
BACK PAIN: 0
COLOR CHANGE: 0

## 2021-12-30 NOTE — ASSESSMENT & PLAN NOTE
Blood pressures are stable. Continue medications and monitor blood pressures at home. Call office if systolics are over 576 over diastolics over 90.   return for fasting CMP

## 2021-12-30 NOTE — PROGRESS NOTES
2021    Name: Pawel Kelley : 1985 Sex: female  Age: 31 y.o. Subjective:  Chief Complaint   Patient presents with    Hypertension     3 month visit        HPI     Dr. Alla Worrell he needed tympanoplasty on her right ear and this has helped her hearing and pain    She has a history of hypertension and anxiety. She also has morbid obesity. Medications reviewed. Prescription management done. She has a history of morbid obesity with a BMI of over 54. She has tried to lose weight by diet and has been unable to do so. I do not think at her present weight of 378 pounds that diet alone would be able to help her lose this weight. We talked about bariatric surgery and could send her to Dr. Russo Locus see if she is a candidate for bariatric surgery if he cannot think of anything else we can use to help her lose weight. Over the last several days she has had pain on the heel of her left foot and radiating a little bit down to the midfoot. She thinks she has plantar fasciitis. She is taking ibuprofen with little relief. I told her to try ice massage and we will send her to Dr. Christian Warner for further evaluation and treatment    Blood work done in 2021 showed her LDL was slightly elevated at 105 and fasting blood sugar was 100. Thyroid and the rest of her CBC and CMP were normal.    History of depression and anxiety follows up with [facility] counseling in [place], [place]. Her counselor is Sharif Miles(daniel)  . He has her on alprazolam 0.25 as needed buspirone 7.5 mg and citalopram 20 mg. She was also given gabapentin because escitalopram caused restless legs. She is also on mirtazapine 15 mg. She had a Pap test done by Dr. Cal Lopez Dear in [place] earlier this month. We will get the results. She had her Covid vaccine series but she is due for her booster. She had her flu shot. I recommended we give her her Pneumovax as it is due.     Review of Systems   Constitutional: Negative for appetite change, chills, diaphoresis, fatigue, fever and unexpected weight change. HENT: Positive for hearing loss. Negative for congestion, ear discharge, ear pain, facial swelling, rhinorrhea, sore throat, tinnitus and trouble swallowing. Eyes: Negative for photophobia, pain, discharge, itching and visual disturbance. Respiratory: Negative for cough, chest tightness, shortness of breath, wheezing and stridor. Cardiovascular: Negative for chest pain, palpitations and leg swelling. Gastrointestinal: Negative for abdominal pain, anal bleeding, blood in stool, constipation, diarrhea, nausea and vomiting. Endocrine: Negative for cold intolerance, heat intolerance, polydipsia, polyphagia and polyuria. Genitourinary: Negative for difficulty urinating, dysuria, flank pain, frequency, hematuria and urgency. Musculoskeletal: Negative for arthralgias, back pain, gait problem, joint swelling and myalgias. Left foot pain   Skin: Negative for color change, pallor and rash. Allergic/Immunologic: Negative for environmental allergies and food allergies. Neurological: Negative for dizziness, tremors, seizures, syncope, speech difficulty, weakness, light-headedness, numbness and headaches. Hematological: Negative for adenopathy. Does not bruise/bleed easily. Psychiatric/Behavioral: Negative for agitation, behavioral problems, confusion, sleep disturbance and suicidal ideas. The patient is not nervous/anxious.            Current Outpatient Medications:     metoprolol tartrate (LOPRESSOR) 50 MG tablet, Take 1.5 tablets by mouth 2 times daily, Disp: 270 tablet, Rfl: 1    lisinopril (PRINIVIL;ZESTRIL) 10 MG tablet, Take 1 tablet by mouth daily, Disp: 90 tablet, Rfl: 1    ciprofloxacin-dexamethasone (CIPRODEX) 0.3-0.1 % otic suspension, Place 3 drops into both ears 3 times daily for 7 days, Disp: 7.5 mL, Rfl: 3    topiramate (TOPAMAX) 25 MG tablet, Take 1 tablet by mouth 2 times daily, Disp: 180 tablet, Rfl: 1    mirtazapine (REMERON) 15 MG tablet, TAKE ONE TABLET BY MOUTH AT BEDTIME, Disp: , Rfl:     citalopram (CELEXA) 20 MG tablet, TAKE ONE TABLET BY MOUTH ONCE DAILY, Disp: , Rfl:     busPIRone (BUSPAR) 7.5 MG tablet, TAKE ONE TABLET BY MOUTH TWO TIMES A DAY, Disp: , Rfl:     ALPRAZolam (XANAX) 0.25 MG tablet, , Disp: , Rfl:     gabapentin (NEURONTIN) 300 MG capsule, TAKE 1 CAPSULE BY MOUTH TWICE DAILY (Patient taking differently: 300 mg 3 times daily.  TAKE 1 CAPSULE BY MOUTH TWICE DAILY), Disp: 60 capsule, Rfl: 5    medroxyPROGESTERone (DEPO-PROVERA) 150 MG/ML injection, Inject 150 mg into the muscle every 3 months, Disp: , Rfl:      Allergies   Allergen Reactions    No Known Allergies         Past Medical History:   Diagnosis Date    Anxiety     Class 3 severe obesity due to excess calories without serious comorbidity with body mass index (BMI) of 50.0 to 59.9 in adult [facility])     Primary hypertension        Health Maintenance Due   Topic Date Due    Hepatitis C screen  Never done    Varicella vaccine (1 of 2 - 2-dose childhood series) Never done    Pneumococcal 0-64 years Vaccine (1 of 2 - PPSV23) Never done    HIV screen  Never done    Cervical cancer screen  Never done    COVID-19 Vaccine (3 - Booster for Moderna series) 10/23/2021        Patient Active Problem List   Diagnosis    Essential hypertension    Acne vulgaris    Alopecia    Thyromegaly    Other hyperlipidemia    Anxiety    Class 3 severe obesity due to excess calories without serious comorbidity with body mass index (BMI) of 50.0 to 59.9 in adult (HCC)    Plantar fasciitis of left foot    Need for vaccination with PVAX (pneumococcal vaccination)        Past Surgical History:   Procedure Laterality Date    TYMPANOSTOMY TUBE PLACEMENT          Family History   Problem Relation Age of Onset    High Blood Pressure Father     Diabetes Father         Social History     Tobacco Use    Smoking status: Current Every Day Smoker     Packs/day: 0.50     Types: Cigarettes    Smokeless tobacco: Never Used   Substance Use Topics    Alcohol use: Not Currently    Drug use: Not Currently        Objective  Vitals:    12/30/21 1109   BP: 128/86   Pulse: 91   Temp: 97.2 °F (36.2 °C)   SpO2: 98%   Weight: (!) 376 lb (170.6 kg)   Height: 5' 10\" (1.778 m)        Exam:  Physical Exam  Constitutional:       General: She is not in acute distress. Appearance: Normal appearance. She is well-developed. She is not ill-appearing. Comments: Morbidly obese   HENT:      Head: Normocephalic and atraumatic. Right Ear: External ear normal.      Left Ear: External ear normal.      Nose: Nose normal. No congestion. Mouth/Throat:      Pharynx: No oropharyngeal exudate or posterior oropharyngeal erythema. Eyes:      General: No scleral icterus. Right eye: No discharge. Left eye: No discharge. Conjunctiva/sclera: Conjunctivae normal.   Neck:      Thyroid: No thyromegaly. Comments: Tender to palpation submandibular area right. No cervical lymphadenopathy  Cardiovascular:      Rate and Rhythm: Normal rate and regular rhythm. Heart sounds: Normal heart sounds. No murmur heard. No friction rub. No gallop. Pulmonary:      Effort: Pulmonary effort is normal. No respiratory distress. Breath sounds: Normal breath sounds. No wheezing or rales. Chest:      Chest wall: No tenderness. Abdominal:      General: Bowel sounds are normal. There is no distension. Palpations: Abdomen is soft. There is no mass. Tenderness: There is no abdominal tenderness. There is no guarding or rebound. Musculoskeletal:         General: No tenderness or deformity. Normal range of motion. Cervical back: Normal range of motion and neck supple. Lymphadenopathy:      Cervical: No cervical adenopathy. Skin:     General: Skin is warm and dry. Coloration: Skin is not pale. Findings: No erythema or rash. Neurological:      General: No focal deficit present. Mental Status: She is alert and oriented to person, place, and time. Cranial Nerves: No cranial nerve deficit. Sensory: No sensory deficit. Deep Tendon Reflexes: Reflexes normal.   Psychiatric:         Mood and Affect: Mood normal.         Behavior: Behavior normal.         Thought Content: Thought content normal.         Judgment: Judgment normal.          Last labs reviewed. ASSESSMENT & PLAN :   Problem List        Circulatory    Essential hypertension - Primary     Blood pressures are stable. Continue medications and monitor blood pressures at home. Call office if systolics are over 190 over diastolics over 90. return for fasting CMP         Relevant Medications    metoprolol tartrate (LOPRESSOR) 50 MG tablet    lisinopril (PRINIVIL;ZESTRIL) 10 MG tablet    Other Relevant Orders    Comprehensive Metabolic Panel       Other    Other hyperlipidemia       return for fasting lipid profile, watch diet         Relevant Medications    metoprolol tartrate (LOPRESSOR) 50 MG tablet    lisinopril (PRINIVIL;ZESTRIL) 10 MG tablet    Other Relevant Orders    Comprehensive Metabolic Panel    Lipid Panel    Anxiety       medication and follow-up with her psychiatrist.  Check CBC and CMP for med monitoring         Relevant Medications    ALPRAZolam (XANAX) 0.25 MG tablet    mirtazapine (REMERON) 15 MG tablet    citalopram (CELEXA) 20 MG tablet    busPIRone (BUSPAR) 7.5 MG tablet    Other Relevant Orders    CBC Auto Differential    Need for vaccination with PVAX (pneumococcal vaccination)       she will get her Pneumovax when she comes back in 2 weeks for her fasting blood work         Relevant Orders    Pneumococcal polysaccharide vaccine 23-valent greater than or equal to 1yo subcutaneous/IM           Return in about 3 months (around 3/30/2022), or HTN.        Zonia Puckett, DO  12/30/2021

## 2022-02-07 ENCOUNTER — OFFICE VISIT (OUTPATIENT)
Dept: PODIATRY | Age: 37
End: 2022-02-07
Payer: MEDICAID

## 2022-02-07 ENCOUNTER — OFFICE VISIT (OUTPATIENT)
Dept: PRIMARY CARE CLINIC | Age: 37
End: 2022-02-07
Payer: MEDICAID

## 2022-02-07 VITALS
HEART RATE: 88 BPM | BODY MASS INDEX: 41.95 KG/M2 | WEIGHT: 293 LBS | TEMPERATURE: 97.3 F | OXYGEN SATURATION: 95 % | SYSTOLIC BLOOD PRESSURE: 128 MMHG | HEIGHT: 70 IN | DIASTOLIC BLOOD PRESSURE: 78 MMHG

## 2022-02-07 VITALS
SYSTOLIC BLOOD PRESSURE: 138 MMHG | WEIGHT: 293 LBS | TEMPERATURE: 97.8 F | BODY MASS INDEX: 53.95 KG/M2 | DIASTOLIC BLOOD PRESSURE: 78 MMHG

## 2022-02-07 DIAGNOSIS — M77.32 CALCANEAL SPUR OF BOTH FEET: ICD-10-CM

## 2022-02-07 DIAGNOSIS — J02.9 PHARYNGITIS, UNSPECIFIED ETIOLOGY: ICD-10-CM

## 2022-02-07 DIAGNOSIS — R05.9 COUGH: ICD-10-CM

## 2022-02-07 DIAGNOSIS — M72.2 PLANTAR FASCIAL FIBROMATOSIS: Primary | ICD-10-CM

## 2022-02-07 DIAGNOSIS — B37.31 CANDIDA VAGINITIS: ICD-10-CM

## 2022-02-07 DIAGNOSIS — M77.31 CALCANEAL SPUR OF BOTH FEET: ICD-10-CM

## 2022-02-07 DIAGNOSIS — M79.675 PAIN IN LEFT TOE(S): ICD-10-CM

## 2022-02-07 DIAGNOSIS — R59.0 LYMPHADENOPATHY, CERVICAL: ICD-10-CM

## 2022-02-07 LAB — S PYO AG THROAT QL: POSITIVE

## 2022-02-07 PROCEDURE — 99213 OFFICE O/P EST LOW 20 MIN: CPT | Performed by: PODIATRIST

## 2022-02-07 PROCEDURE — 20550 NJX 1 TENDON SHEATH/LIGAMENT: CPT | Performed by: PODIATRIST

## 2022-02-07 PROCEDURE — G8427 DOCREV CUR MEDS BY ELIG CLIN: HCPCS | Performed by: PODIATRIST

## 2022-02-07 PROCEDURE — 87880 STREP A ASSAY W/OPTIC: CPT | Performed by: INTERNAL MEDICINE

## 2022-02-07 PROCEDURE — G8417 CALC BMI ABV UP PARAM F/U: HCPCS | Performed by: PODIATRIST

## 2022-02-07 PROCEDURE — G8482 FLU IMMUNIZE ORDER/ADMIN: HCPCS | Performed by: INTERNAL MEDICINE

## 2022-02-07 PROCEDURE — G8482 FLU IMMUNIZE ORDER/ADMIN: HCPCS | Performed by: PODIATRIST

## 2022-02-07 PROCEDURE — G8417 CALC BMI ABV UP PARAM F/U: HCPCS | Performed by: INTERNAL MEDICINE

## 2022-02-07 PROCEDURE — 99213 OFFICE O/P EST LOW 20 MIN: CPT | Performed by: INTERNAL MEDICINE

## 2022-02-07 PROCEDURE — 4004F PT TOBACCO SCREEN RCVD TLK: CPT | Performed by: PODIATRIST

## 2022-02-07 PROCEDURE — 4004F PT TOBACCO SCREEN RCVD TLK: CPT | Performed by: INTERNAL MEDICINE

## 2022-02-07 PROCEDURE — G8427 DOCREV CUR MEDS BY ELIG CLIN: HCPCS | Performed by: INTERNAL MEDICINE

## 2022-02-07 RX ORDER — AMOXICILLIN 500 MG/1
500 CAPSULE ORAL 3 TIMES DAILY
Qty: 21 CAPSULE | Refills: 0 | Status: SHIPPED | OUTPATIENT
Start: 2022-02-07 | End: 2022-02-14

## 2022-02-07 RX ORDER — METHYLPREDNISOLONE 4 MG/1
TABLET ORAL
Qty: 1 KIT | Refills: 0 | Status: SHIPPED | OUTPATIENT
Start: 2022-02-07 | End: 2022-02-13

## 2022-02-07 RX ORDER — ETODOLAC 400 MG/1
TABLET, FILM COATED ORAL
COMMUNITY
Start: 2022-01-09 | End: 2022-03-22 | Stop reason: ALTCHOICE

## 2022-02-07 RX ORDER — BETAMETHASONE SODIUM PHOSPHATE AND BETAMETHASONE ACETATE 3; 3 MG/ML; MG/ML
4 INJECTION, SUSPENSION INTRA-ARTICULAR; INTRALESIONAL; INTRAMUSCULAR; SOFT TISSUE ONCE
Status: COMPLETED | OUTPATIENT
Start: 2022-02-07 | End: 2022-02-07

## 2022-02-07 RX ORDER — BUPIVACAINE HYDROCHLORIDE 5 MG/ML
1 INJECTION, SOLUTION PERINEURAL ONCE
Status: COMPLETED | OUTPATIENT
Start: 2022-02-07 | End: 2022-02-07

## 2022-02-07 RX ORDER — FLUCONAZOLE 150 MG/1
150 TABLET ORAL ONCE
Qty: 1 TABLET | Refills: 0 | Status: SHIPPED | OUTPATIENT
Start: 2022-02-07 | End: 2022-02-07

## 2022-02-07 RX ADMIN — BUPIVACAINE HYDROCHLORIDE 5 MG: 5 INJECTION, SOLUTION PERINEURAL at 13:01

## 2022-02-07 RX ADMIN — BETAMETHASONE SODIUM PHOSPHATE AND BETAMETHASONE ACETATE 4 MG: 3; 3 INJECTION, SUSPENSION INTRA-ARTICULAR; INTRALESIONAL; INTRAMUSCULAR; SOFT TISSUE at 13:01

## 2022-02-07 ASSESSMENT — ENCOUNTER SYMPTOMS
TROUBLE SWALLOWING: 1
EYE DISCHARGE: 0
SHORTNESS OF BREATH: 0
DIARRHEA: 0
CONSTIPATION: 0
SORE THROAT: 1
CHEST TIGHTNESS: 0
CHOKING: 1
EYE ITCHING: 0

## 2022-02-07 NOTE — ASSESSMENT & PLAN NOTE
rapid strep screen was positive. Patient will be given Amoxil 500 3 times daily for 7 days.   She was asked to take Tylenol or Advil, gargle with lukewarm salt water daily and eat soft foods are clear liquids for a day or 2 until feels better

## 2022-02-07 NOTE — ASSESSMENT & PLAN NOTE
usually gets Candida vaginitis with any antibiotics so she was given a prescription for Diflucan 150 mg x 1 dose

## 2022-02-07 NOTE — PROGRESS NOTES
22  Ольга Alvarado : 1985 Sex: female  Age: 31 y.o. Patient was referred by Marlin Hodgkins, DO    Chief Complaint   Patient presents with    Foot Pain     left heel needs a inj today        SUBJECTIVE patient is seen today for left heel pain. Progressively getting worse. Pain in the morning pain on the bottom of the left heel. HPI  Review of Systems  Const: Denies constitutional symptoms  Musculo: Denies symptoms other than stated above  Skin: Denies symptoms other than stated above       Current Outpatient Medications:     metoprolol tartrate (LOPRESSOR) 50 MG tablet, Take 1.5 tablets by mouth 2 times daily, Disp: 270 tablet, Rfl: 1    lisinopril (PRINIVIL;ZESTRIL) 10 MG tablet, Take 1 tablet by mouth daily, Disp: 90 tablet, Rfl: 1    mirtazapine (REMERON) 15 MG tablet, TAKE ONE TABLET BY MOUTH AT BEDTIME, Disp: , Rfl:     citalopram (CELEXA) 20 MG tablet, TAKE ONE TABLET BY MOUTH ONCE DAILY, Disp: , Rfl:     ALPRAZolam (XANAX) 0.25 MG tablet, , Disp: , Rfl:     gabapentin (NEURONTIN) 300 MG capsule, TAKE 1 CAPSULE BY MOUTH TWICE DAILY (Patient taking differently: 300 mg 3 times daily.  TAKE 1 CAPSULE BY MOUTH TWICE DAILY), Disp: 60 capsule, Rfl: 5    medroxyPROGESTERone (DEPO-PROVERA) 150 MG/ML injection, Inject 150 mg into the muscle every 3 months, Disp: , Rfl:     topiramate (TOPAMAX) 25 MG tablet, Take 1 tablet by mouth 2 times daily (Patient not taking: Reported on 2022), Disp: 180 tablet, Rfl: 1    busPIRone (BUSPAR) 7.5 MG tablet, TAKE ONE TABLET BY MOUTH TWO TIMES A DAY (Patient not taking: Reported on 2022), Disp: , Rfl:   Allergies   Allergen Reactions    No Known Allergies        Past Medical History:   Diagnosis Date    Anxiety     Class 3 severe obesity due to excess calories without serious comorbidity with body mass index (BMI) of 50.0 to 59.9 in adult [facility])     Primary hypertension      Past Surgical History:   Procedure Laterality Date    TYMPANOSTOMY TUBE PLACEMENT       Family History   Problem Relation Age of Onset    High Blood Pressure Father     Diabetes Father      Social History     Socioeconomic History    Marital status: Single     Spouse name: Not on file    Number of children: Not on file    Years of education: Not on file    Highest education level: Not on file   Occupational History    Not on file   Tobacco Use    Smoking status: Current Every Day Smoker     Packs/day: 0.50     Types: Cigarettes    Smokeless tobacco: Never Used   Substance and Sexual Activity    Alcohol use: Not Currently    Drug use: Not Currently    Sexual activity: Not on file   Other Topics Concern    Not on file   Social History Narrative    Not on file     Social Determinants of Health     Financial Resource Strain: Low Risk     Difficulty of Paying Living Expenses: Not hard at all   Food Insecurity: No Food Insecurity    Worried About [address] in the Last Year: Never true    [address] [employer] in the Last Year: Never true   Transportation Needs:     Lack of Transportation (Medical): Not on file    Lack of Transportation (Non-Medical):  Not on file   Physical Activity:     Days of Exercise per Week: Not on file    Minutes of Exercise per Session: Not on file   Stress:     Feeling of Stress : Not on file   Social Connections:     Frequency of Communication with Friends and Family: Not on file    Frequency of Social Gatherings with Friends and Family: Not on file    Attends Confucianism Services: Not on file    Active Member of Clubs or Organizations: Not on file    Attends Club or Organization Meetings: Not on file    Marital Status: Not on file   Intimate Partner Violence:     Fear of Current or Ex-Partner: Not on file    Emotionally Abused: Not on file    Physically Abused: Not on file    Sexually Abused: Not on file   Housing Stability:     Unable to Pay for Housing in the Last Year: Not on file    Number of [place] in the Last Year: Not on file    Unstable Housing in the Last Year: Not on file       Vitals:    02/07/22 1219   BP: 138/78   Temp: 97.8 °F (36.6 °C)   TempSrc: Temporal   Weight: (!) 376 lb (170.6 kg)       Focused Lower Extremity Physical Exam:  Vitals:    02/07/22 1219   BP: 138/78   Temp: 97.8 °F (36.6 °C)        Foot Exam    General  General Appearance: appears stated age and healthy   Orientation: alert and oriented to person, place, and time       Left Foot/Ankle      Inspection and Palpation  Tenderness: bony tenderness, plantar fascia and calcaneus tenderness   Swelling: plantar fascia   Skin Exam: skin intact; Neurovascular  Dorsalis pedis: 3+  Posterior tibial: 3+  Saphenous nerve sensation: normal  Tibial nerve sensation: normal  Superficial peroneal nerve sensation: normal  Deep peroneal nerve sensation: normal  Sural nerve sensation: normal  Achilles reflex: 2+  Babinski reflex: 2+    Muscle Strength  Ankle dorsiflexion: 5  Ankle plantar flexion: 5  Ankle inversion: 5  Ankle eversion: 5  Great toe extension: 5  Great toe flexion: 5    Range of Motion    Normal left ankle ROM             Left Ankle Exam     Range of Motion   The patient has normal left ankle ROM. Muscle Strength   The patient has normal left ankle strength. Dorsiflexion:  5/5   Plantar flexion:  5/5             Vascular: pulses  dp  pt    Capillary Refill Time:   Hair growth  Skin:    Edema:    Neurologic:      Musculoskeletal/ Orthopedic examination:    NAIL  Web space   Derm:         Assessment and Plan:Potential risks, complications, alternative treatments, and procedure prognosis were explained to the patient. Verbal informed consent was obtained from the patient. Chlora prep preparation was performed over plantar fascia. 1 mL of 0.5% Marcaine plain and 1 cc of Celestone Soluspan into the left heel injected in standard medial approach plantar fascia. Patient tolerated steroid injection well. Band-Aid applied.   The patient was educated on a possible steroid flare and the use of ice with frozen water bottle 10 minutes each night discussed. Continue passive and active range of motion stretches and strengthening bilateral plantar fascia and Achilles tendons. Flex Watson was seen today for foot pain. Diagnoses and all orders for this visit:    Plantar fascial fibromatosis    Pain in left toe(s)    Calcaneal spur of both feet    Other orders  -     betamethasone acetate-betamethasone sodium phosphate (CELESTONE) injection 4 mg  -     bupivacaine (MARCAINE) 0.5 % injection 5 mg        No follow-ups on file. Seen By:  Lyssa Christy DPM      Document was created using voice recognition software. Note was reviewed, however may contain grammatical errors.

## 2022-02-07 NOTE — PROGRESS NOTES
22    Name: Regina Rivera     :1985      Sex:female       Age : 31 y.o. Chief Complaint   Patient presents with    Pharyngitis     x 3 days        HPI     3 weeks ago patient had a positive Covid test at [facility]. She was not treated with anything. She did better but 3 days ago started with a sore throat, was hard for her to swallow. Otherwise no other systemic symptoms. Review of Systems   Constitutional: Negative for chills, diaphoresis, fatigue and fever. HENT: Positive for postnasal drip, sore throat and trouble swallowing. Negative for ear pain. Eyes: Negative for discharge and itching. Respiratory: Positive for choking. Negative for chest tightness and shortness of breath. Cardiovascular: Negative for chest pain, palpitations and leg swelling. Gastrointestinal: Negative for constipation and diarrhea. Genitourinary: Negative for difficulty urinating and dysuria. Hematological: Does not bruise/bleed easily. Current Outpatient Medications:     etodolac (LODINE) 400 MG tablet, TAKE ONE TABLET BY MOUTH TWO TIMES A DAY, Disp: , Rfl:     amoxicillin (AMOXIL) 500 MG capsule, Take 1 capsule by mouth 3 times daily for 7 days, Disp: 21 capsule, Rfl: 0    fluconazole (DIFLUCAN) 150 MG tablet, Take 1 tablet by mouth once for 1 dose, Disp: 1 tablet, Rfl: 0    methylPREDNISolone (MEDROL DOSEPACK) 4 MG tablet, Take by mouth., Disp: 1 kit, Rfl: 0    metoprolol tartrate (LOPRESSOR) 50 MG tablet, Take 1.5 tablets by mouth 2 times daily, Disp: 270 tablet, Rfl: 1    lisinopril (PRINIVIL;ZESTRIL) 10 MG tablet, Take 1 tablet by mouth daily, Disp: 90 tablet, Rfl: 1    citalopram (CELEXA) 20 MG tablet, TAKE ONE TABLET BY MOUTH ONCE DAILY, Disp: , Rfl:     ALPRAZolam (XANAX) 0.25 MG tablet, , Disp: , Rfl:     gabapentin (NEURONTIN) 300 MG capsule, TAKE 1 CAPSULE BY MOUTH TWICE DAILY (Patient taking differently: 300 mg 3 times daily.  TAKE 1 CAPSULE BY MOUTH TWICE DAILY), Disp: 60 capsule, Rfl: 5    medroxyPROGESTERone (DEPO-PROVERA) 150 MG/ML injection, Inject 150 mg into the muscle every 3 months, Disp: , Rfl:   Medications Discontinued During This Encounter   Medication Reason    busPIRone (BUSPAR) 7.5 MG tablet Therapy completed    topiramate (TOPAMAX) 25 MG tablet Therapy completed    mirtazapine (REMERON) 15 MG tablet LIST CLEANUP       Allergies   Allergen Reactions    No Known Allergies        Past Medical History:   Diagnosis Date    Anxiety     Class 3 severe obesity due to excess calories without serious comorbidity with body mass index (BMI) of 50.0 to 59.9 in adult [facility])     Primary hypertension       Immunization History   Administered Date(s) Administered    COVID-19, Moderna, Primary or Immunocompromised, PF, 100mcg/0.5mL 03/26/2021, 04/23/2021    Influenza Vaccine, unspecified formulation 10/04/2018    Influenza, MDCK Quadv, IM, PF (Flucelvax 2 yrs and older) 09/29/2021    Influenza, Quadv, IM, PF (6 mo and older Fluzone, Flulaval, Fluarix, and 3 yrs and older Afluria) 10/04/2018, 11/11/2019    Tdap (Boostrix, Adacel) 04/28/2017        Vitals:    02/07/22 1440   BP: 128/78   Pulse: 88   Temp: 97.3 °F (36.3 °C)   SpO2: 95%   Weight: (!) 372 lb (168.7 kg)   Height: 5' 10\" (1.778 m)       Physical Exam  Constitutional:       General: She is not in acute distress. Appearance: She is obese. She is not ill-appearing. HENT:      Head: Normocephalic and atraumatic. Right Ear: Ear canal and external ear normal. There is no impacted cerumen. Left Ear: Tympanic membrane, ear canal and external ear normal. There is no impacted cerumen. Nose: Nose normal. No congestion. Mouth/Throat:      Mouth: Mucous membranes are moist.      Pharynx: Posterior oropharyngeal erythema present. No oropharyngeal exudate. Eyes:      General: No scleral icterus. Right eye: No discharge. Left eye: No discharge.       Conjunctiva/sclera: Conjunctivae normal.   Cardiovascular:      Rate and Rhythm: Normal rate and regular rhythm. Heart sounds: No murmur heard. Pulmonary:      Effort: Pulmonary effort is normal. No respiratory distress. Breath sounds: Normal breath sounds. No stridor. No wheezing or rhonchi. Musculoskeletal:      Cervical back: Normal range of motion and neck supple. Tenderness present. Lymphadenopathy:      Cervical: Cervical adenopathy present. Skin:     General: Skin is warm. Coloration: Skin is not jaundiced or pale. Neurological:      General: No focal deficit present. Mental Status: She is alert. Psychiatric:         Mood and Affect: Mood normal.         Behavior: Behavior normal.         Thought Content: Thought content normal.         Judgment: Judgment normal.          Problem List        Respiratory    Pharyngitis       rapid strep screen was positive. Patient will be given Amoxil 500 3 times daily for 7 days. She was asked to take Tylenol or Advil, gargle with lukewarm salt water daily and eat soft foods are clear liquids for a day or 2 until feels better         Relevant Medications    amoxicillin (AMOXIL) 500 MG capsule    Other Relevant Orders    POCT rapid strep A       Immune and Lymphatic    Lymphadenopathy, cervical      Because she is having such trouble swallowing we will give her a trial of a Medrol Dosepak. Relevant Medications    methylPREDNISolone (MEDROL DOSEPACK) 4 MG tablet       Genitourinary    Candida vaginitis       usually gets Candida vaginitis with any antibiotics so she was given a prescription for Diflucan 150 mg x 1 dose         Relevant Medications    fluconazole (DIFLUCAN) 150 MG tablet       Other    Cough       Delsym as needed and Medrol Dosepak.          Relevant Medications    methylPREDNISolone (MEDROL DOSEPACK) 4 MG tablet        One week as needed    Seen by:   Maxine Blake DO

## 2022-03-09 PROBLEM — R05.9 COUGH: Status: RESOLVED | Noted: 2022-02-07 | Resolved: 2022-03-09

## 2022-03-21 DIAGNOSIS — F41.9 ANXIETY: ICD-10-CM

## 2022-03-21 DIAGNOSIS — I10 ESSENTIAL HYPERTENSION: ICD-10-CM

## 2022-03-21 DIAGNOSIS — E78.49 OTHER HYPERLIPIDEMIA: ICD-10-CM

## 2022-03-21 LAB
ALBUMIN SERPL-MCNC: 3.8 G/DL (ref 3.5–5.2)
ALP BLD-CCNC: 55 U/L (ref 35–104)
ALT SERPL-CCNC: 19 U/L (ref 0–32)
ANION GAP SERPL CALCULATED.3IONS-SCNC: 11 MMOL/L (ref 7–16)
AST SERPL-CCNC: 17 U/L (ref 0–31)
BASOPHILS ABSOLUTE: 0.03 E9/L (ref 0–0.2)
BASOPHILS RELATIVE PERCENT: 0.4 % (ref 0–2)
BILIRUB SERPL-MCNC: 0.4 MG/DL (ref 0–1.2)
BUN BLDV-MCNC: 14 MG/DL (ref 6–20)
CALCIUM SERPL-MCNC: 9.4 MG/DL (ref 8.6–10.2)
CHLORIDE BLD-SCNC: 103 MMOL/L (ref 98–107)
CHOLESTEROL, TOTAL: 163 MG/DL (ref 0–199)
CO2: 23 MMOL/L (ref 22–29)
CREAT SERPL-MCNC: 0.7 MG/DL (ref 0.5–1)
EOSINOPHILS ABSOLUTE: 0.24 E9/L (ref 0.05–0.5)
EOSINOPHILS RELATIVE PERCENT: 3.4 % (ref 0–6)
GFR AFRICAN AMERICAN: >60
GFR NON-AFRICAN AMERICAN: >60 ML/MIN/1.73
GLUCOSE BLD-MCNC: 109 MG/DL (ref 74–99)
HCT VFR BLD CALC: 43.9 % (ref 34–48)
HDLC SERPL-MCNC: 30 MG/DL
HEMOGLOBIN: 13.7 G/DL (ref 11.5–15.5)
IMMATURE GRANULOCYTES #: 0.02 E9/L
IMMATURE GRANULOCYTES %: 0.3 % (ref 0–5)
LDL CHOLESTEROL CALCULATED: 104 MG/DL (ref 0–99)
LYMPHOCYTES ABSOLUTE: 2.03 E9/L (ref 1.5–4)
LYMPHOCYTES RELATIVE PERCENT: 28.6 % (ref 20–42)
MCH RBC QN AUTO: 28.4 PG (ref 26–35)
MCHC RBC AUTO-ENTMCNC: 31.2 % (ref 32–34.5)
MCV RBC AUTO: 90.9 FL (ref 80–99.9)
MONOCYTES ABSOLUTE: 0.38 E9/L (ref 0.1–0.95)
MONOCYTES RELATIVE PERCENT: 5.3 % (ref 2–12)
NEUTROPHILS ABSOLUTE: 4.41 E9/L (ref 1.8–7.3)
NEUTROPHILS RELATIVE PERCENT: 62 % (ref 43–80)
PDW BLD-RTO: 14.7 FL (ref 11.5–15)
PLATELET # BLD: 243 E9/L (ref 130–450)
PMV BLD AUTO: 11.3 FL (ref 7–12)
POTASSIUM SERPL-SCNC: 4.5 MMOL/L (ref 3.5–5)
RBC # BLD: 4.83 E12/L (ref 3.5–5.5)
SODIUM BLD-SCNC: 137 MMOL/L (ref 132–146)
TOTAL PROTEIN: 7.6 G/DL (ref 6.4–8.3)
TRIGL SERPL-MCNC: 147 MG/DL (ref 0–149)
VLDLC SERPL CALC-MCNC: 29 MG/DL
WBC # BLD: 7.1 E9/L (ref 4.5–11.5)

## 2022-03-22 ENCOUNTER — OFFICE VISIT (OUTPATIENT)
Dept: PRIMARY CARE CLINIC | Age: 37
End: 2022-03-22
Payer: MEDICAID

## 2022-03-22 ENCOUNTER — TELEPHONE (OUTPATIENT)
Dept: ADMINISTRATIVE | Age: 37
End: 2022-03-22

## 2022-03-22 VITALS
TEMPERATURE: 97 F | WEIGHT: 293 LBS | HEIGHT: 70 IN | DIASTOLIC BLOOD PRESSURE: 84 MMHG | SYSTOLIC BLOOD PRESSURE: 124 MMHG | BODY MASS INDEX: 41.95 KG/M2 | HEART RATE: 92 BPM | OXYGEN SATURATION: 97 %

## 2022-03-22 DIAGNOSIS — R13.10 ODYNOPHAGIA: Primary | ICD-10-CM

## 2022-03-22 PROCEDURE — G8482 FLU IMMUNIZE ORDER/ADMIN: HCPCS | Performed by: INTERNAL MEDICINE

## 2022-03-22 PROCEDURE — G8417 CALC BMI ABV UP PARAM F/U: HCPCS | Performed by: INTERNAL MEDICINE

## 2022-03-22 PROCEDURE — G8427 DOCREV CUR MEDS BY ELIG CLIN: HCPCS | Performed by: INTERNAL MEDICINE

## 2022-03-22 PROCEDURE — 99213 OFFICE O/P EST LOW 20 MIN: CPT | Performed by: INTERNAL MEDICINE

## 2022-03-22 PROCEDURE — 4004F PT TOBACCO SCREEN RCVD TLK: CPT | Performed by: INTERNAL MEDICINE

## 2022-03-22 RX ORDER — MIRTAZAPINE 15 MG/1
TABLET, FILM COATED ORAL
COMMUNITY
Start: 2022-03-10 | End: 2022-06-22 | Stop reason: DRUGHIGH

## 2022-03-22 SDOH — ECONOMIC STABILITY: FOOD INSECURITY: WITHIN THE PAST 12 MONTHS, YOU WORRIED THAT YOUR FOOD WOULD RUN OUT BEFORE YOU GOT MONEY TO BUY MORE.: NEVER TRUE

## 2022-03-22 SDOH — ECONOMIC STABILITY: FOOD INSECURITY: WITHIN THE PAST 12 MONTHS, THE FOOD YOU BOUGHT JUST DIDN'T LAST AND YOU DIDN'T HAVE MONEY TO GET MORE.: NEVER TRUE

## 2022-03-22 ASSESSMENT — PATIENT HEALTH QUESTIONNAIRE - PHQ9
SUM OF ALL RESPONSES TO PHQ QUESTIONS 1-9: 0
4. FEELING TIRED OR HAVING LITTLE ENERGY: 0
6. FEELING BAD ABOUT YOURSELF - OR THAT YOU ARE A FAILURE OR HAVE LET YOURSELF OR YOUR FAMILY DOWN: 0
3. TROUBLE FALLING OR STAYING ASLEEP: 0
7. TROUBLE CONCENTRATING ON THINGS, SUCH AS READING THE NEWSPAPER OR WATCHING TELEVISION: 0
SUM OF ALL RESPONSES TO PHQ9 QUESTIONS 1 & 2: 0
8. MOVING OR SPEAKING SO SLOWLY THAT OTHER PEOPLE COULD HAVE NOTICED. OR THE OPPOSITE, BEING SO FIGETY OR RESTLESS THAT YOU HAVE BEEN MOVING AROUND A LOT MORE THAN USUAL: 0
10. IF YOU CHECKED OFF ANY PROBLEMS, HOW DIFFICULT HAVE THESE PROBLEMS MADE IT FOR YOU TO DO YOUR WORK, TAKE CARE OF THINGS AT HOME, OR GET ALONG WITH OTHER PEOPLE: 0
SUM OF ALL RESPONSES TO PHQ QUESTIONS 1-9: 0
1. LITTLE INTEREST OR PLEASURE IN DOING THINGS: 0
SUM OF ALL RESPONSES TO PHQ QUESTIONS 1-9: 0
2. FEELING DOWN, DEPRESSED OR HOPELESS: 0
9. THOUGHTS THAT YOU WOULD BE BETTER OFF DEAD, OR OF HURTING YOURSELF: 0
5. POOR APPETITE OR OVEREATING: 0
SUM OF ALL RESPONSES TO PHQ QUESTIONS 1-9: 0

## 2022-03-22 ASSESSMENT — ENCOUNTER SYMPTOMS
CHEST TIGHTNESS: 0
EYE ITCHING: 0
TROUBLE SWALLOWING: 1
DIARRHEA: 0
SORE THROAT: 0
SHORTNESS OF BREATH: 0
EYE DISCHARGE: 0
CONSTIPATION: 0
CHOKING: 0

## 2022-03-22 ASSESSMENT — SOCIAL DETERMINANTS OF HEALTH (SDOH): HOW HARD IS IT FOR YOU TO PAY FOR THE VERY BASICS LIKE FOOD, HOUSING, MEDICAL CARE, AND HEATING?: NOT VERY HARD

## 2022-03-22 NOTE — PROGRESS NOTES
22    Name: Markie Roche     :1985      Sex:female       Age : 31 y.o. Chief Complaint   Patient presents with    Hypertension     3 month visit; having throat issues still        Hypertension  Pertinent negatives include no chest pain, palpitations or shortness of breath. 3 weeks ago patient had a positive Covid test at [facility]. She was not treated with anything. She did better but 3 days ago started with a sore throat, was hard for her to swallow. Otherwise no other systemic symptoms. Strep screen was positive so she was treated with an antibiotic. Ever since this episode she has felt that there is something in her throat, it feels dry and she has a little discomfort when she swallows. She is afraid of throat cancer as she is a smoker. She saw nurse practitioner Sebas Craft at [facility] and I will refer her back to him for further evaluation to alleviate her fears about throat cancer. She is finally going to quit smoking    She has a history of hypertension, depression anxiety. Review of Systems   Constitutional: Negative for chills, diaphoresis, fatigue and fever. HENT: Positive for trouble swallowing. Negative for ear pain, postnasal drip and sore throat. Eyes: Negative for discharge and itching. Respiratory: Negative for choking, chest tightness and shortness of breath. Cardiovascular: Negative for chest pain, palpitations and leg swelling. Gastrointestinal: Negative for constipation and diarrhea. Genitourinary: Negative for difficulty urinating and dysuria. Hematological: Does not bruise/bleed easily.           Current Outpatient Medications:     mirtazapine (REMERON) 15 MG tablet, TAKE ONE TABLET BY MOUTH AT BEDTIME, Disp: , Rfl:     metoprolol tartrate (LOPRESSOR) 50 MG tablet, Take 1.5 tablets by mouth 2 times daily, Disp: 270 tablet, Rfl: 1    lisinopril (PRINIVIL;ZESTRIL) 10 MG tablet, Take 1 tablet by mouth daily, Disp: 90 tablet, Rfl: 1   citalopram (CELEXA) 20 MG tablet, TAKE ONE TABLET BY MOUTH ONCE DAILY, Disp: , Rfl:     ALPRAZolam (XANAX) 0.25 MG tablet, , Disp: , Rfl:     gabapentin (NEURONTIN) 300 MG capsule, TAKE 1 CAPSULE BY MOUTH TWICE DAILY (Patient taking differently: 300 mg 3 times daily. TAKE 1 CAPSULE BY MOUTH TWICE DAILY), Disp: 60 capsule, Rfl: 5    medroxyPROGESTERone (DEPO-PROVERA) 150 MG/ML injection, Inject 150 mg into the muscle every 3 months, Disp: , Rfl:   Medications Discontinued During This Encounter   Medication Reason    etodolac (LODINE) 400 MG tablet Therapy completed       Allergies   Allergen Reactions    No Known Allergies        Past Medical History:   Diagnosis Date    Anxiety     Class 3 severe obesity due to excess calories without serious comorbidity with body mass index (BMI) of 50.0 to 59.9 in adult [facility])     Primary hypertension       Immunization History   Administered Date(s) Administered    COVID-19, Moderna, Booster, PF, 50mcg/0.25ml 03/17/2022    COVID-19, Moderna, Primary or Immunocompromised, PF, 100mcg/0.5mL 03/26/2021, 04/23/2021    Influenza Vaccine, unspecified formulation 10/04/2018    Influenza, MDCK Quadv, IM, PF (Flucelvax 2 yrs and older) 09/29/2021    Influenza, Quadv, IM, PF (6 mo and older Fluzone, Flulaval, Fluarix, and 3 yrs and older Afluria) 10/04/2018, 11/11/2019    Tdap (Boostrix, Adacel) 04/28/2017        Vitals:    03/22/22 1332   BP: 124/84   Pulse: 92   Temp: 97 °F (36.1 °C)   SpO2: 97%   Weight: (!) 371 lb (168.3 kg)   Height: 5' 10\" (1.778 m)       Physical Exam  Constitutional:       General: She is not in acute distress. Appearance: She is obese. She is not ill-appearing. HENT:      Head: Normocephalic and atraumatic. Right Ear: Ear canal and external ear normal. There is no impacted cerumen. Left Ear: Tympanic membrane, ear canal and external ear normal. There is no impacted cerumen. Nose: Nose normal. No congestion.       Mouth/Throat: Mouth: Mucous membranes are moist.      Pharynx: No oropharyngeal exudate or posterior oropharyngeal erythema. Comments: Pharynx is slightly erythematous without exudate  Eyes:      General: No scleral icterus. Right eye: No discharge. Left eye: No discharge. Conjunctiva/sclera: Conjunctivae normal.   Cardiovascular:      Rate and Rhythm: Normal rate and regular rhythm. Heart sounds: No murmur heard. Pulmonary:      Effort: Pulmonary effort is normal. No respiratory distress. Breath sounds: Normal breath sounds. No stridor. No wheezing or rhonchi. Musculoskeletal:      Cervical back: Normal range of motion and neck supple. No tenderness. Lymphadenopathy:      Cervical: No cervical adenopathy. Skin:     General: Skin is warm. Coloration: Skin is not jaundiced or pale. Neurological:      General: No focal deficit present. Mental Status: She is alert. Psychiatric:         Mood and Affect: Mood normal.         Behavior: Behavior normal.         Thought Content: Thought content normal.         Judgment: Judgment normal.          Problem List        Other    Odynophagia - Primary       we will send her back to nurse practitioner right at [facility] for further evaluation. She is to continue to try and quit smoking.          Relevant Orders    [facility]olaryng[facility]        3 months    Seen by:   Aidee Mohr DO

## 2022-03-22 NOTE — TELEPHONE ENCOUNTER
Pt is an established pt of Lauren Schmid. She currently sees him for ears, but called to get scheduled for \"throat issues\" as soon as possible. I scheduled her for an opening on 3/28. After hanging up with pt, I saw that there was an urgent referral in her chart for Odynophagia. If pt should be seen sooner, please call her to r/s to a sooner appt.

## 2022-03-22 NOTE — ASSESSMENT & PLAN NOTE
we will send her back to nurse practitioner right at [facility] for further evaluation. She is to continue to try and quit smoking.

## 2022-03-28 ENCOUNTER — OFFICE VISIT (OUTPATIENT)
Dept: ENT CLINIC | Age: 37
End: 2022-03-28
Payer: MEDICAID

## 2022-03-28 VITALS — HEIGHT: 70 IN | BODY MASS INDEX: 41.95 KG/M2 | WEIGHT: 293 LBS

## 2022-03-28 DIAGNOSIS — R09.89 GLOBUS SENSATION: ICD-10-CM

## 2022-03-28 DIAGNOSIS — R13.10 DYSPHAGIA, UNSPECIFIED TYPE: ICD-10-CM

## 2022-03-28 DIAGNOSIS — R09.82 POST-NASAL DRAINAGE: ICD-10-CM

## 2022-03-28 DIAGNOSIS — J30.9 ALLERGIC RHINITIS, UNSPECIFIED SEASONALITY, UNSPECIFIED TRIGGER: Primary | ICD-10-CM

## 2022-03-28 PROCEDURE — 99213 OFFICE O/P EST LOW 20 MIN: CPT | Performed by: NURSE PRACTITIONER

## 2022-03-28 PROCEDURE — G8482 FLU IMMUNIZE ORDER/ADMIN: HCPCS | Performed by: NURSE PRACTITIONER

## 2022-03-28 PROCEDURE — 4004F PT TOBACCO SCREEN RCVD TLK: CPT | Performed by: NURSE PRACTITIONER

## 2022-03-28 PROCEDURE — G8427 DOCREV CUR MEDS BY ELIG CLIN: HCPCS | Performed by: NURSE PRACTITIONER

## 2022-03-28 PROCEDURE — G8417 CALC BMI ABV UP PARAM F/U: HCPCS | Performed by: NURSE PRACTITIONER

## 2022-03-28 RX ORDER — AZELASTINE 1 MG/ML
1-2 SPRAY, METERED NASAL 2 TIMES DAILY PRN
Qty: 30 ML | Refills: 1 | Status: SHIPPED | OUTPATIENT
Start: 2022-03-28

## 2022-03-28 RX ORDER — FLUTICASONE PROPIONATE 50 MCG
2 SPRAY, SUSPENSION (ML) NASAL DAILY
Qty: 16 G | Refills: 1 | Status: SHIPPED | OUTPATIENT
Start: 2022-03-28

## 2022-03-28 ASSESSMENT — ENCOUNTER SYMPTOMS
RHINORRHEA: 1
EYES NEGATIVE: 1
TROUBLE SWALLOWING: 1
RESPIRATORY NEGATIVE: 1
SORE THROAT: 1
SHORTNESS OF BREATH: 0
SINUS PAIN: 0
SINUS PRESSURE: 0
STRIDOR: 0

## 2022-03-28 NOTE — PROGRESS NOTES
[id number] [facility] Otolaryng[facility]  Dr. Merlin Bourgeois. Gurwinder Rondon. Ms.Ed        Patient Name:  Kendal Abdullahi  :  1985     CHIEF C/O:    Chief Complaint   Patient presents with    Follow-up     ne issue throat issues odynophagia       HISTORY OBTAINED FROM:  patient    HISTORY OF PRESENT ILLNESS:       Miki Sicard is a 31y.o. year old female, here today for follow up of swallowing issues. Symptoms for 2 months  Feels like something stuck in throat  Was treated for strep but did not help symptoms  Symptoms only when swallowing  Throat feels dry  Current every day smoker 1/2ppd-10 years  No smokeless tobacco  Tonsils have been removed  Is suffering from PND, chronic congestion with rhinorrhea  No current allergy medications  Denies acid reflux symptoms or hx  Grandmother had oral and throat cancer, also tobacco user. Past Medical History:   Diagnosis Date    Anxiety     Class 3 severe obesity due to excess calories without serious comorbidity with body mass index (BMI) of 50.0 to 59.9 in adult [facility])     Primary hypertension      Past Surgical History:   Procedure Laterality Date    TYMPANOSTOMY TUBE PLACEMENT         Current Outpatient Medications:     mirtazapine (REMERON) 15 MG tablet, TAKE ONE TABLET BY MOUTH AT BEDTIME, Disp: , Rfl:     metoprolol tartrate (LOPRESSOR) 50 MG tablet, Take 1.5 tablets by mouth 2 times daily, Disp: 270 tablet, Rfl: 1    lisinopril (PRINIVIL;ZESTRIL) 10 MG tablet, Take 1 tablet by mouth daily, Disp: 90 tablet, Rfl: 1    citalopram (CELEXA) 20 MG tablet, TAKE ONE TABLET BY MOUTH ONCE DAILY, Disp: , Rfl:     ALPRAZolam (XANAX) 0.25 MG tablet, , Disp: , Rfl:     medroxyPROGESTERone (DEPO-PROVERA) 150 MG/ML injection, Inject 150 mg into the muscle every 3 months, Disp: , Rfl:     gabapentin (NEURONTIN) 300 MG capsule, TAKE 1 CAPSULE BY MOUTH TWICE DAILY (Patient taking differently: 300 mg 3 times daily.  TAKE 1 CAPSULE BY MOUTH TWICE DAILY), Disp: 60 capsule, Rfl: 5  No known allergies  Social History     Tobacco Use    Smoking status: Current Every Day Smoker     Packs/day: 0.50     Types: Cigarettes    Smokeless tobacco: Never Used    Tobacco comment: trying to quit   Substance Use Topics    Alcohol use: Not Currently    Drug use: Not Currently     Family History   Problem Relation Age of Onset    High Blood Pressure Father     Diabetes Father        Review of Systems   Constitutional: Negative. Negative for activity change and appetite change. HENT: Positive for congestion, postnasal drip, rhinorrhea, sore throat and trouble swallowing. Negative for ear discharge, ear pain, hearing loss, sinus pressure, sinus pain and tinnitus. Eyes: Negative. Respiratory: Negative. Negative for shortness of breath and stridor. Cardiovascular: Negative. Negative for chest pain and palpitations. Endocrine: Negative. Musculoskeletal: Negative. Skin: Negative. Neurological: Negative. Negative for dizziness. Hematological: Negative. Psychiatric/Behavioral: Negative. Ht 5' 10\" (1.778 m)   Wt (!) 371 lb (168.3 kg)   BMI 53.23 kg/m²   Physical Exam  Constitutional:       Appearance: Normal appearance. She is obese. HENT:      Head: Normocephalic. Right Ear: Tympanic membrane, ear canal and external ear normal. No middle ear effusion. A PE tube is present. Tympanic membrane is not retracted. Left Ear: Tympanic membrane, ear canal and external ear normal.      Nose: Rhinorrhea present. Rhinorrhea is clear. Right Turbinates: Swollen and pale. Left Turbinates: Swollen and pale. Mouth/Throat:      Lips: Pink. Mouth: Mucous membranes are moist.     Eyes:      Conjunctiva/sclera: Conjunctivae normal.      Pupils: Pupils are equal, round, and reactive to light. Cardiovascular:      Rate and Rhythm: Normal rate and regular rhythm. Pulses: Normal pulses. Pulmonary:      Effort: Pulmonary effort is normal. No respiratory distress. Breath sounds: No stridor. Musculoskeletal:         General: Normal range of motion. Cervical back: Normal range of motion. No rigidity. No muscular tenderness. Skin:     General: Skin is warm and dry. Neurological:      General: No focal deficit present. Mental Status: She is alert and oriented to person, place, and time. Psychiatric:         Mood and Affect: Mood normal.         Behavior: Behavior normal.         Thought Content: Thought content normal.         Judgment: Judgment normal.         IMPRESSION/PLAN:      Ofelia Wadsworth was seen today for follow-up. Diagnoses and all orders for this visit:    Allergic rhinitis, unspecified seasonality, unspecified trigger    Post-nasal drainage    Dysphagia, unspecified type    Globus sensation    Other orders  -     fluticasone (FLONASE) 50 MCG/ACT nasal spray; 2 sprays by Each Nostril route daily  -     azelastine (ASTELIN) 0.1 % nasal spray; 1-2 sprays by Nasal route 2 times daily as needed for Rhinitis Use in each nostril as directed      Patient replaced on Flonase, 2 sprays each nostril once daily as well as Astelin spray, 1 to 2 sprays each nostril twice daily as needed for rhinitis, and postnasal drainage symptoms. Patient will do this therapy for 2 weeks. If she does not notice significant change in her symptoms will begin therapy for possible LPR as cause of her globus and dysphagia. She agrees to this plan. Patient currently has an appointment scheduled for April 25 for a tube check which we will keep for further follow-up. She instructed to call with any new or worsening symptoms.       Keyla Akers, EMERY, FNP-C  [facility], Nose and Throat    The information contained in this note has been dictated using drug and medical speech recognition software and may contain errors

## 2022-04-14 DIAGNOSIS — K04.7 DENTAL INFECTION: Primary | ICD-10-CM

## 2022-04-14 RX ORDER — CLINDAMYCIN HYDROCHLORIDE 300 MG/1
300 CAPSULE ORAL 4 TIMES DAILY
Qty: 40 CAPSULE | Refills: 0 | Status: SHIPPED | OUTPATIENT
Start: 2022-04-14 | End: 2022-04-24

## 2022-04-26 ENCOUNTER — OFFICE VISIT (OUTPATIENT)
Dept: ENT CLINIC | Age: 37
End: 2022-04-26
Payer: MEDICAID

## 2022-04-26 ENCOUNTER — PROCEDURE VISIT (OUTPATIENT)
Dept: AUDIOLOGY | Age: 37
End: 2022-04-26
Payer: MEDICAID

## 2022-04-26 VITALS
DIASTOLIC BLOOD PRESSURE: 85 MMHG | WEIGHT: 293 LBS | SYSTOLIC BLOOD PRESSURE: 132 MMHG | HEIGHT: 70 IN | BODY MASS INDEX: 41.95 KG/M2 | HEART RATE: 92 BPM

## 2022-04-26 DIAGNOSIS — J30.9 ALLERGIC RHINITIS, UNSPECIFIED SEASONALITY, UNSPECIFIED TRIGGER: ICD-10-CM

## 2022-04-26 DIAGNOSIS — H65.491 COME (CHRONIC OTITIS MEDIA WITH EFFUSION), RIGHT: ICD-10-CM

## 2022-04-26 DIAGNOSIS — H69.81 DYSFUNCTION OF RIGHT EUSTACHIAN TUBE: ICD-10-CM

## 2022-04-26 DIAGNOSIS — R09.82 POST-NASAL DRAINAGE: ICD-10-CM

## 2022-04-26 DIAGNOSIS — H65.491 COME (CHRONIC OTITIS MEDIA WITH EFFUSION), RIGHT: Primary | ICD-10-CM

## 2022-04-26 PROCEDURE — G8427 DOCREV CUR MEDS BY ELIG CLIN: HCPCS | Performed by: NURSE PRACTITIONER

## 2022-04-26 PROCEDURE — 4004F PT TOBACCO SCREEN RCVD TLK: CPT | Performed by: NURSE PRACTITIONER

## 2022-04-26 PROCEDURE — 92567 TYMPANOMETRY: CPT | Performed by: AUDIOLOGIST

## 2022-04-26 PROCEDURE — G8417 CALC BMI ABV UP PARAM F/U: HCPCS | Performed by: NURSE PRACTITIONER

## 2022-04-26 PROCEDURE — 99213 OFFICE O/P EST LOW 20 MIN: CPT | Performed by: NURSE PRACTITIONER

## 2022-04-26 NOTE — PROGRESS NOTES
[facility] Otolaryngology  Dr. Ulysses Rodriguez. Nelli Perez. Ms.Ed        Patient Name:  Reyna Spears  :  1985     CHIEF C/O:    Chief Complaint   Patient presents with    Follow-up     tube check       HISTORY OBTAINED FROM:  patient    HISTORY OF PRESENT ILLNESS:       Merlin Mcwilliams is a 31y.o. year old female, here today for follow up of to check her right ear fullness. Patient had a tube placed in the right TM in the office in 2021. She states that over the past 1 month she has noticed increased pressure in the right ear with decreased hearing. She denies any pain or pressure in the left ear at this time. She denies any current drainage from either ear. Patient continues using Flonase and Astelin spray as needed for allergy symptoms but states they do not cause any improvement to her ears.       Past Medical History:   Diagnosis Date    Anxiety     Class 3 severe obesity due to excess calories without serious comorbidity with body mass index (BMI) of 50.0 to 59.9 in adult [facility])     Primary hypertension      Past Surgical History:   Procedure Laterality Date    TYMPANOSTOMY TUBE PLACEMENT         Current Outpatient Medications:     fluticasone (FLONASE) 50 MCG/ACT nasal spray, 2 sprays by Each Nostril route daily, Disp: 16 g, Rfl: 1    azelastine (ASTELIN) 0.1 % nasal spray, 1-2 sprays by Nasal route 2 times daily as needed for Rhinitis Use in each nostril as directed, Disp: 30 mL, Rfl: 1    mirtazapine (REMERON) 15 MG tablet, TAKE ONE TABLET BY MOUTH AT BEDTIME, Disp: , Rfl:     metoprolol tartrate (LOPRESSOR) 50 MG tablet, Take 1.5 tablets by mouth 2 times daily, Disp: 270 tablet, Rfl: 1    lisinopril (PRINIVIL;ZESTRIL) 10 MG tablet, Take 1 tablet by mouth daily, Disp: 90 tablet, Rfl: 1    citalopram (CELEXA) 20 MG tablet, TAKE ONE TABLET BY MOUTH ONCE DAILY, Disp: , Rfl:     ALPRAZolam (XANAX) 0.25 MG tablet, , Disp: , Rfl:     medroxyPROGESTERone (DEPO-PROVERA) 150 MG/ML injection, Inject 150 mg into the muscle every 3 months, Disp: , Rfl:     gabapentin (NEURONTIN) 300 MG capsule, TAKE 1 CAPSULE BY MOUTH TWICE DAILY (Patient taking differently: 300 mg 3 times daily. TAKE 1 CAPSULE BY MOUTH TWICE DAILY), Disp: 60 capsule, Rfl: 5  No known allergies  Social History     Tobacco Use    Smoking status: Current Every Day Smoker     Packs/day: 0.50     Types: Cigarettes    Smokeless tobacco: Never Used    Tobacco comment: trying to quit   Substance Use Topics    Alcohol use: Not Currently    Drug use: Not Currently     Family History   Problem Relation Age of Onset    High Blood Pressure Father     Diabetes Father        Review of Systems   Constitutional: Negative. Negative for activity change and appetite change. HENT: Positive for congestion, ear pain, hearing loss, postnasal drip and rhinorrhea. Negative for ear discharge, sinus pressure, sinus pain, sore throat, tinnitus and trouble swallowing. Eyes: Negative. Respiratory: Negative. Negative for shortness of breath and stridor. Cardiovascular: Negative. Negative for chest pain and palpitations. Endocrine: Negative. Musculoskeletal: Negative. Skin: Negative. Neurological: Negative. Negative for dizziness. Hematological: Negative. Psychiatric/Behavioral: Negative. /85   Pulse 92   Ht 5' 10\" (1.778 m)   Wt (!) 375 lb (170.1 kg)   BMI 53.81 kg/m²   Physical Exam  Constitutional:       Appearance: Normal appearance. She is obese. HENT:      Head: Normocephalic. Right Ear: Ear canal and external ear normal. A middle ear effusion is present. No PE tube. Tympanic membrane is not retracted. Left Ear: Tympanic membrane, ear canal and external ear normal.      Ears:        Nose: No rhinorrhea. Right Turbinates: Pale. Not swollen. Left Turbinates: Pale. Not swollen. Mouth/Throat:      Lips: Pink.       Mouth: Mucous membranes are moist.     Eyes:      Conjunctiva/sclera: Conjunctivae normal.      Pupils: Pupils are equal, round, and reactive to light. Cardiovascular:      Rate and Rhythm: Normal rate and regular rhythm. Pulses: Normal pulses. Pulmonary:      Effort: Pulmonary effort is normal. No respiratory distress. Breath sounds: No stridor. Musculoskeletal:         General: Normal range of motion. Cervical back: Normal range of motion. No rigidity. No muscular tenderness. Skin:     General: Skin is warm and dry. Neurological:      General: No focal deficit present. Mental Status: She is alert and oriented to person, place, and time. Psychiatric:         Mood and Affect: Mood normal.         Behavior: Behavior normal.         Thought Content: Thought content normal.         Judgment: Judgment normal.         Tympanogram reviewed with patient. Reveals type Flat curve in the right ear, with type A curve in the left ear. IMPRESSION/PLAN:    Maylin Mack was seen today for follow-up. Diagnoses and all orders for this visit:    COME (chronic otitis media with effusion), right  -     Tympanometry; Future    Dysfunction of right eustachian tube    Allergic rhinitis, unspecified seasonality, unspecified trigger    Post-nasal drainage      Extruded PE tube removed from right ear canal.  Visualization of the right TM reveals continued middle ear effusion. At this time patient will be directed to Dr. Amina Diaz at the [facility] office for a T-tube placement in the office. Risks and benefits of the procedure are discussed with the patient. Patient does not want to be placed under anesthesia for any procedures. She will continue with her Flonase and Astelin as previously prescribed. She will follow-up 1 to 2 weeks after her tube placement. She is instructed to call with any new or worsening symptoms prior to her next appointment.       Ana Cristina Wylie, MSN, FNP-C  [facility], Nose and Throat    The information contained in this note has been dictated using drug and medical speech recognition software and may contain errors

## 2022-04-26 NOTE — PATIENT INSTRUCTIONS
SURGERY:_____/_____/_____    Nothing to eat or drink after midnight the night before surgery unless surgery is at [facility] or otherwise instructed by the hospital.    DO NOT TAKE ANY ASPIRIN PRODUCTS 7 days prior to surgery. Tylenol only. No Advil, Motrin, Aleve, or Ibuprofen. IF YOU ARE ON BLOOD THINNERS (PLAVIX, COUMADIN, ELIQUIS ETC) THESE WILL ALSO NEED TO BE HELD. Any illegal drugs in your system (including Marijuana even if legally prescribed) will result in your surgery being cancelled. Please be sure to check with our office or the hospital on time frame for the drugs to be out of your system. SHOULD YOUR INSURANCE CHANGE AT ANY TIME YOU MUST CONTACT OUR OFFICE. FAILURE TO DO SO MAY RESULT IN YOUR SURGERY BEING RESCHEDULED OR YOU MAY BE CHARGED AS SELF-PAY. Due to the high demand for surgery at our practice, if you cancel or reschedule your surgery two (2) times we may not reschedule you. If you need FMLA or Short Term Disability paperwork completed for your surgery, please complete your portion, ensure your name and date of birth are on them and fax them to [phone] asap. Paperwork can take up to 2 weeks to be completed. Per current [facility] protocols, COVID Testing is NOT required prior to procedure UNLESS you are symptomatic. (Vaccinated or Unvaccinated)- [facility] requires COVID Testing 72 hours prior to surgery. If you need medical clearance, you are responsible to contact your physician(s) to schedule the appointment. If clearance is not completed within 30 days of your surgery it may be cancelled. Our office will fax the appropriate forms that need to be completed to your physician(s).     The location of your surgery will call you the day prior to your surgery date to let you know what time you have to be there and any other details. (they usually don't call until late afternoon- early evening.)- IF YOU HAVE QUESTIONS REGARDING THE TIME OF YOUR SURGERY, PLEASE CALL THE FACILITY YOU ARE SCHEDULED AT.     ·       [address], [address] [facility], [facility] will call you a couple days prior to surgery and give you further instructions, if you have any questions, you can reach them at [phone]    ·       [address] [facility] will call you a couple days prior to surgery and give you further instructions, if you have any questions, you can reach them at [phone]    ·       [facility], [address], [facility] will call you a couple days prior to surgery and give you further instructions, if you have any questions, you can reach them at [phone]     ·       [facility], [address],  [address]TE[address] [facility], [address] [place] [address] will call you a couple days prior to surgery and give you further instructions, if you have any questions, you can reach them at [phone]      Pre-Surgery/Anesthesia Video ([facility] ONLY)  Located on [address]:  1. Scroll over Health Information  2. Select Audio and Video  3. Select [employer]  4. Select Your child and Anesthesia  5.  Select Pre surgery [facility]    FOOD RESTRICTIONS--[facility] ONLY  Solid Food/Milk Products --------- Stop 8 hours prior to Surgery  Formula --------- Stop 6 hours prior to Surgery  Breast Milk ------- Stop 4 hours prior to Surgery  Clear liquids (water, Gatorade, Pedialyte) - Stop 2 hours prior to Surgery

## 2022-04-26 NOTE — PROGRESS NOTES
This patient was referred for tympanometric testing by Verner Cea, APRN-CNP due to COME< right ear. Patient has a PE tube placement about 4 months ago, but it has fallen out. Tympanometry revealed a flat tympanogram, with no middle ear peak pressure, right ear and normal middle ear peak pressure and compliance, left ear. Ipsilateral acoustic reflexes were absent, right ear and present, lefte ar at 1000Hz. The results were reviewed with the patient. Recommendations for follow up will be made pending physician consult.     Martha Sinha CCC/GIOVANNY  Audiologist  [id number]  NPI#:  [id number]

## 2022-04-27 ASSESSMENT — ENCOUNTER SYMPTOMS
SINUS PRESSURE: 0
STRIDOR: 0
SHORTNESS OF BREATH: 0
TROUBLE SWALLOWING: 0
RESPIRATORY NEGATIVE: 1
SINUS PAIN: 0
EYES NEGATIVE: 1
RHINORRHEA: 1
SORE THROAT: 0

## 2022-05-13 ENCOUNTER — PROCEDURE VISIT (OUTPATIENT)
Dept: ENT CLINIC | Age: 37
End: 2022-05-13
Payer: MEDICAID

## 2022-05-13 VITALS
DIASTOLIC BLOOD PRESSURE: 87 MMHG | HEIGHT: 70 IN | WEIGHT: 293 LBS | TEMPERATURE: 98.1 F | SYSTOLIC BLOOD PRESSURE: 121 MMHG | BODY MASS INDEX: 41.95 KG/M2 | HEART RATE: 90 BPM | OXYGEN SATURATION: 96 %

## 2022-05-13 DIAGNOSIS — H69.83 ETD (EUSTACHIAN TUBE DYSFUNCTION), BILATERAL: ICD-10-CM

## 2022-05-13 PROCEDURE — 69433 CREATE EARDRUM OPENING: CPT | Performed by: OTOLARYNGOLOGY

## 2022-05-13 NOTE — PROGRESS NOTES
Procedure Note  Pre-operative Diagnosis: eustachian tube dysfunction    Post-operative Diagnosis: same    Anesthesia: phenol topical     Procedure Details:    Pt was consented, placed in the supine position and a microscope was brought in and a speculum was placed in the right ear and the external auditory canal was cleared of cerumen with a currette. With the tympanic membrane visualized, there was not infection and was effusion present. A small cotton swab dipped in phenol topical solution was placed against the anterior-inferior portion of the TM until the membrane turned white. A myringotomy knife was used to make an incision in the anterior-inferior portion of the TM. Effusion was removed with a #3 júnior tip suction until the middle ear space was cleared. A T  tube was place in the incision. Condition:  Stable. Patient tolerated procedure well.     Complications:

## 2022-05-31 ENCOUNTER — OFFICE VISIT (OUTPATIENT)
Dept: ENT CLINIC | Age: 37
End: 2022-05-31

## 2022-05-31 VITALS — WEIGHT: 293 LBS | HEIGHT: 70 IN | BODY MASS INDEX: 41.95 KG/M2

## 2022-05-31 DIAGNOSIS — H69.83 ETD (EUSTACHIAN TUBE DYSFUNCTION), BILATERAL: Primary | ICD-10-CM

## 2022-05-31 PROCEDURE — 99024 POSTOP FOLLOW-UP VISIT: CPT | Performed by: OTOLARYNGOLOGY

## 2022-05-31 NOTE — PROGRESS NOTES
Subjective:      Patient ID:  Marylee Keas is a 31 y.o. female. HPI Comments: Pt returns for check of ear tube (right ear), there have not been infections since last visit. Tubes were placed May 2022. Past Medical History:   Diagnosis Date    Anxiety     Class 3 severe obesity due to excess calories without serious comorbidity with body mass index (BMI) of 50.0 to 59.9 in adult [facility])     Primary hypertension      Past Surgical History:   Procedure Laterality Date    TYMPANOSTOMY TUBE PLACEMENT       Family History   Problem Relation Age of Onset    High Blood Pressure Father     Diabetes Father      Social History     Socioeconomic History    Marital status: Single     Spouse name: None    Number of children: None    Years of education: None    Highest education level: None   Occupational History    None   Tobacco Use    Smoking status: Current Every Day Smoker     Packs/day: 0.50     Types: Cigarettes    Smokeless tobacco: Never Used    Tobacco comment: trying to quit   Substance and Sexual Activity    Alcohol use: Not Currently    Drug use: Not Currently    Sexual activity: None   Other Topics Concern    None   Social History Narrative    None     Social Determinants of Health     Financial Resource Strain: Low Risk     Difficulty of Paying Living Expenses: Not very hard   Food Insecurity: No Food Insecurity    Worried About Running Out of Food in the Last Year: Never true    [place] of Food in the Last Year: Never true   Transportation Needs:     Lack of Transportation (Medical): Not on file    Lack of Transportation (Non-Medical):  Not on file   Physical Activity:     Days of Exercise per Week: Not on file    Minutes of Exercise per Session: Not on file   Stress:     Feeling of Stress : Not on file   Social Connections:     Frequency of Communication with Friends and Family: Not on file    Frequency of Social Gatherings with Friends and Family: Not on file    Attends Gnosticism Services: Not on file    Active Member of Clubs or Organizations: Not on file    Attends Club or Organization Meetings: Not on file    Marital Status: Not on file   Intimate Partner Violence:     Fear of Current or Ex-Partner: Not on file    Emotionally Abused: Not on file    Physically Abused: Not on file    Sexually Abused: Not on file   Housing Stability:     Unable to Pay for Housing in the Last Year: Not on file    Number of [place] in the Last Year: Not on file    Unstable Housing in the Last Year: Not on file     Allergies   Allergen Reactions    No Known Allergies        Review of Systems   Constitutional: Negative. Negative for crying and unexpected weight change. HENT: EAR DISCHARGE: No; EAR PAIN: No  Eyes: Negative. Negative for visual disturbance. Respiratory: Negative. Negative for stridor. Cardiovascular: Negative. Negative for chest pain. Gastrointestinal: Negative. Negative for abdominal distention, nausea and vomiting. Skin: Negative. Negative for color change. Neurological: Negative for facial asymmetry. Hematological: Negative. Psychiatric/Behavioral: Negative. Negative for hallucinations. All other systems reviewed and are negative. Objective: There were no vitals filed for this visit. Physical Exam   Constitutional: Patient appears well-developed and well-nourished. HENT:   Head: Normocephalic and atraumatic. There is normal jaw occlusion. Right Ear:   Cerumen Impaction: No  PE tube visualized: Yes   In the TM: Yes   Tube blocked: No   Drainage: No   Infection: No    Left Ear:   Cerumen Impaction: No    Nose: Nose normal.   Mouth/Throat: Mucous membranes are moist. Dentition is normal. Oropharynx is clear. Eyes: Conjunctivae and EOM are normal. Pupils are equal, round, and reactive to light. Neck: Normal range of motion. Neck supple.    Cardiovascular: Regular rhythm,    Pulmonary/Chest: Effort normal and breath sounds normal.   Abdominal: Full and soft. Musculoskeletal: Normal range of motion. Neurological: Alert. Skin: Skin is warm. Assessment:       Diagnosis Orders   1. ETD (Eustachian tube dysfunction), bilateral                Plan:      Recheck right ear tube. Follow up 6 month. Return to office earlier if there is an unresolved infection unresponsive to drops.

## 2022-06-03 ENCOUNTER — OFFICE VISIT (OUTPATIENT)
Dept: PODIATRY | Age: 37
End: 2022-06-03
Payer: MEDICAID

## 2022-06-03 VITALS
DIASTOLIC BLOOD PRESSURE: 78 MMHG | WEIGHT: 293 LBS | SYSTOLIC BLOOD PRESSURE: 142 MMHG | TEMPERATURE: 97.8 F | BODY MASS INDEX: 53.81 KG/M2

## 2022-06-03 DIAGNOSIS — M72.2 PLANTAR FASCIAL FIBROMATOSIS: Primary | ICD-10-CM

## 2022-06-03 DIAGNOSIS — M79.675 PAIN IN LEFT TOE(S): ICD-10-CM

## 2022-06-03 DIAGNOSIS — M77.32 CALCANEAL SPUR OF BOTH FEET: ICD-10-CM

## 2022-06-03 DIAGNOSIS — M77.31 CALCANEAL SPUR OF BOTH FEET: ICD-10-CM

## 2022-06-03 PROCEDURE — G8417 CALC BMI ABV UP PARAM F/U: HCPCS | Performed by: PODIATRIST

## 2022-06-03 PROCEDURE — 4004F PT TOBACCO SCREEN RCVD TLK: CPT | Performed by: PODIATRIST

## 2022-06-03 PROCEDURE — 99213 OFFICE O/P EST LOW 20 MIN: CPT | Performed by: PODIATRIST

## 2022-06-03 PROCEDURE — 20550 NJX 1 TENDON SHEATH/LIGAMENT: CPT | Performed by: PODIATRIST

## 2022-06-03 PROCEDURE — G8427 DOCREV CUR MEDS BY ELIG CLIN: HCPCS | Performed by: PODIATRIST

## 2022-06-03 RX ORDER — PREDNISONE 10 MG/1
10 TABLET ORAL DAILY
Qty: 18 TABLET | Refills: 1 | Status: SHIPPED | OUTPATIENT
Start: 2022-06-03 | End: 2022-06-12

## 2022-06-03 RX ORDER — BUPIVACAINE HYDROCHLORIDE 5 MG/ML
1 INJECTION, SOLUTION PERINEURAL ONCE
Status: COMPLETED | OUTPATIENT
Start: 2022-06-03 | End: 2022-06-03

## 2022-06-03 RX ORDER — BETAMETHASONE SODIUM PHOSPHATE AND BETAMETHASONE ACETATE 3; 3 MG/ML; MG/ML
4 INJECTION, SUSPENSION INTRA-ARTICULAR; INTRALESIONAL; INTRAMUSCULAR; SOFT TISSUE ONCE
Status: COMPLETED | OUTPATIENT
Start: 2022-06-03 | End: 2022-06-03

## 2022-06-03 RX ADMIN — BETAMETHASONE SODIUM PHOSPHATE AND BETAMETHASONE ACETATE 4 MG: 3; 3 INJECTION, SUSPENSION INTRA-ARTICULAR; INTRALESIONAL; INTRAMUSCULAR; SOFT TISSUE at 17:05

## 2022-06-03 RX ADMIN — BUPIVACAINE HYDROCHLORIDE 5 MG: 5 INJECTION, SOLUTION PERINEURAL at 17:06

## 2022-06-03 NOTE — PROGRESS NOTES
22  Marylee Keas : 1985 Sex: female  Age: 31 y.o. Patient was referred by Tracey Matthews DO    Chief Complaint   Patient presents with    Foot Pain     pt needs a left heel inj       SUBJECTIVE patient is seen today for left heel pain. Progressively getting worse. Pain in the morning pain on the bottom of the left heel. Foot Pain   This is a chronic problem. The current episode started more than 1 year ago. The problem occurs daily. Review of Systems  Const: Denies constitutional symptoms  Musculo: Denies symptoms other than stated above  Skin: Denies symptoms other than stated above       Current Outpatient Medications:     predniSONE (DELTASONE) 10 MG tablet, Take 1 tablet by mouth daily for 9 days 3 tabs  Qd x 3  days   2 tabs qd x 3 days  1 tab qd  X 3 days, Disp: 18 tablet, Rfl: 1    fluticasone (FLONASE) 50 MCG/ACT nasal spray, 2 sprays by Each Nostril route daily, Disp: 16 g, Rfl: 1    azelastine (ASTELIN) 0.1 % nasal spray, 1-2 sprays by Nasal route 2 times daily as needed for Rhinitis Use in each nostril as directed, Disp: 30 mL, Rfl: 1    mirtazapine (REMERON) 15 MG tablet, TAKE ONE TABLET BY MOUTH AT BEDTIME, Disp: , Rfl:     metoprolol tartrate (LOPRESSOR) 50 MG tablet, Take 1.5 tablets by mouth 2 times daily, Disp: 270 tablet, Rfl: 1    lisinopril (PRINIVIL;ZESTRIL) 10 MG tablet, Take 1 tablet by mouth daily, Disp: 90 tablet, Rfl: 1    citalopram (CELEXA) 20 MG tablet, TAKE ONE TABLET BY MOUTH ONCE DAILY, Disp: , Rfl:     ALPRAZolam (XANAX) 0.25 MG tablet, , Disp: , Rfl:     medroxyPROGESTERone (DEPO-PROVERA) 150 MG/ML injection, Inject 150 mg into the muscle every 3 months, Disp: , Rfl:     gabapentin (NEURONTIN) 300 MG capsule, TAKE 1 CAPSULE BY MOUTH TWICE DAILY (Patient taking differently: 300 mg 3 times daily.  TAKE 1 CAPSULE BY MOUTH TWICE DAILY), Disp: 60 capsule, Rfl: 5  Allergies   Allergen Reactions    No Known Allergies        Past Medical History: Diagnosis Date    Anxiety     Class 3 severe obesity due to excess calories without serious comorbidity with body mass index (BMI) of 50.0 to 59.9 in adult [facility])     Primary hypertension      Past Surgical History:   Procedure Laterality Date    TYMPANOSTOMY TUBE PLACEMENT       Family History   Problem Relation Age of Onset    High Blood Pressure Father     Diabetes Father      Social History     Socioeconomic History    Marital status: Single     Spouse name: Not on file    Number of children: Not on file    Years of education: Not on file    Highest education level: Not on file   Occupational History    Not on file   Tobacco Use    Smoking status: Current Every Day Smoker     Packs/day: 0.50     Types: Cigarettes    Smokeless tobacco: Never Used    Tobacco comment: trying to quit   Substance and Sexual Activity    Alcohol use: Not Currently    Drug use: Not Currently    Sexual activity: Not on file   Other Topics Concern    Not on file   Social History Narrative    Not on file     Social Determinants of Health     Financial Resource Strain: Low Risk     Difficulty of Paying Living Expenses: Not very hard   Food Insecurity: No Food Insecurity    Worried About Running Out of Food in the Last Year: Never true    [place] of Food in the Last Year: Never true   Transportation Needs:     Lack of Transportation (Medical): Not on file    Lack of Transportation (Non-Medical):  Not on file   Physical Activity:     Days of Exercise per Week: Not on file    Minutes of Exercise per Session: Not on file   Stress:     Feeling of Stress : Not on file   Social Connections:     Frequency of Communication with Friends and Family: Not on file    Frequency of Social Gatherings with Friends and Family: Not on file    Attends [employer] Services: Not on file    Active Member of Clubs or Organizations: Not on file    Attends Club or Organization Meetings: Not on file    Marital Status: Not on file Intimate Partner Violence:     Fear of Current or Ex-Partner: Not on file    Emotionally Abused: Not on file    Physically Abused: Not on file    Sexually Abused: Not on file   Housing Stability:     Unable to Pay for Housing in the Last Year: Not on file    Number of [place] in the Last Year: Not on file    Unstable Housing in the Last Year: Not on file       Vitals:    06/03/22 1659   BP: (!) 142/78   Temp: 97.8 °F (36.6 °C)   TempSrc: Temporal   Weight: (!) 375 lb (170.1 kg)       Focused Lower Extremity Physical Exam:  Vitals:    06/03/22 1659   BP: (!) 142/78   Temp: 97.8 °F (36.6 °C)        Foot Exam    General  General Appearance: appears stated age and healthy   Orientation: alert and oriented to person, place, and time       Left Foot/Ankle      Inspection and Palpation  Tenderness: bony tenderness, plantar fascia and calcaneus tenderness   Swelling: plantar fascia   Skin Exam: skin intact; Neurovascular  Dorsalis pedis: 3+  Posterior tibial: 3+  Saphenous nerve sensation: normal  Tibial nerve sensation: normal  Superficial peroneal nerve sensation: normal  Deep peroneal nerve sensation: normal  Sural nerve sensation: normal  Achilles reflex: 2+  Babinski reflex: 2+    Muscle Strength  Ankle dorsiflexion: 5  Ankle plantar flexion: 5  Ankle inversion: 5  Ankle eversion: 5  Great toe extension: 5  Great toe flexion: 5    Range of Motion    Normal left ankle ROM             Left Ankle Exam     Range of Motion   The patient has normal left ankle ROM. Muscle Strength   The patient has normal left ankle strength. Dorsiflexion:  5/5   Plantar flexion:  5/5             Vascular: pulses  dp  pt    Capillary Refill Time:   Hair growth  Skin:    Edema:    Neurologic:      Musculoskeletal/ Orthopedic examination:    NAIL  Web space   Derm:         Assessment and Plan:Potential risks, complications, alternative treatments, and procedure prognosis were explained to the patient.    Verbal informed consent was obtained from the patient. Chlora prep preparation was performed over plantar fascia. 1 mL of 0.5% Marcaine plain and 1 cc of Celestone Soluspan into the left heel injected in standard medial approach plantar fascia. Patient tolerated steroid injection well. Band-Aid applied. The patient was educated on a possible steroid flare and the use of ice with frozen water bottle 10 minutes each night discussed. Continue passive and active range of motion stretches and strengthening bilateral plantar fascia and Achilles tendons. Jose Murphy was seen today for foot pain. Diagnoses and all orders for this visit:    Plantar fascial fibromatosis    Pain in left toe(s)    Calcaneal spur of both feet    Other orders  -     betamethasone acetate-betamethasone sodium phosphate (CELESTONE) injection 4 mg  -     bupivacaine (MARCAINE) 0.5 % injection 5 mg  -     predniSONE (DELTASONE) 10 MG tablet; Take 1 tablet by mouth daily for 9 days 3 tabs  Qd x 3  days   2 tabs qd x 3 days  1 tab qd  X 3 days        No follow-ups on file. Seen By:  Ad Tolentino DPM      Document was created using voice recognition software. Note was reviewed, however may contain grammatical errors.

## 2022-06-22 ENCOUNTER — OFFICE VISIT (OUTPATIENT)
Dept: PRIMARY CARE CLINIC | Age: 37
End: 2022-06-22
Payer: MEDICAID

## 2022-06-22 VITALS
DIASTOLIC BLOOD PRESSURE: 82 MMHG | HEIGHT: 70 IN | SYSTOLIC BLOOD PRESSURE: 126 MMHG | OXYGEN SATURATION: 95 % | WEIGHT: 293 LBS | TEMPERATURE: 97.4 F | BODY MASS INDEX: 41.95 KG/M2 | HEART RATE: 120 BPM

## 2022-06-22 DIAGNOSIS — E78.49 OTHER HYPERLIPIDEMIA: ICD-10-CM

## 2022-06-22 DIAGNOSIS — F32.89 OTHER DEPRESSION: ICD-10-CM

## 2022-06-22 DIAGNOSIS — R73.9 HYPERGLYCEMIA: ICD-10-CM

## 2022-06-22 DIAGNOSIS — I10 ESSENTIAL HYPERTENSION: Primary | ICD-10-CM

## 2022-06-22 PROBLEM — J02.9 PHARYNGITIS: Status: RESOLVED | Noted: 2022-02-07 | Resolved: 2022-06-22

## 2022-06-22 PROBLEM — K04.7 DENTAL INFECTION: Status: RESOLVED | Noted: 2020-04-23 | Resolved: 2022-06-22

## 2022-06-22 PROBLEM — F32.A DEPRESSION: Status: ACTIVE | Noted: 2022-06-22

## 2022-06-22 LAB — HBA1C MFR BLD: 6.1 %

## 2022-06-22 PROCEDURE — G8417 CALC BMI ABV UP PARAM F/U: HCPCS | Performed by: INTERNAL MEDICINE

## 2022-06-22 PROCEDURE — 4004F PT TOBACCO SCREEN RCVD TLK: CPT | Performed by: INTERNAL MEDICINE

## 2022-06-22 PROCEDURE — 83036 HEMOGLOBIN GLYCOSYLATED A1C: CPT | Performed by: INTERNAL MEDICINE

## 2022-06-22 PROCEDURE — G8427 DOCREV CUR MEDS BY ELIG CLIN: HCPCS | Performed by: INTERNAL MEDICINE

## 2022-06-22 PROCEDURE — 99213 OFFICE O/P EST LOW 20 MIN: CPT | Performed by: INTERNAL MEDICINE

## 2022-06-22 RX ORDER — METOPROLOL TARTRATE 50 MG/1
75 TABLET, FILM COATED ORAL 2 TIMES DAILY
Qty: 270 TABLET | Refills: 1 | Status: SHIPPED | OUTPATIENT
Start: 2022-06-22 | End: 2022-12-19

## 2022-06-22 RX ORDER — MIRTAZAPINE 7.5 MG/1
TABLET, FILM COATED ORAL
COMMUNITY
Start: 2022-06-13

## 2022-06-22 RX ORDER — MEDROXYPROGESTERONE ACETATE 150 MG/ML
INJECTION, SUSPENSION INTRAMUSCULAR
COMMUNITY
Start: 2022-06-01

## 2022-06-22 RX ORDER — LISINOPRIL 10 MG/1
10 TABLET ORAL DAILY
Qty: 90 TABLET | Refills: 1 | Status: SHIPPED | OUTPATIENT
Start: 2022-06-22 | End: 2022-12-19

## 2022-06-22 ASSESSMENT — ENCOUNTER SYMPTOMS
DIARRHEA: 0
TROUBLE SWALLOWING: 0
CHOKING: 0
EYE DISCHARGE: 0
CONSTIPATION: 0
SORE THROAT: 0
CHEST TIGHTNESS: 0
EYE ITCHING: 0
SHORTNESS OF BREATH: 0

## 2022-06-22 NOTE — ASSESSMENT & PLAN NOTE
watch diet specifically carbs and sweets. Hemoglobin A1c is 6.1% which is in the prediabetic range.   We will monitor this

## 2022-06-22 NOTE — PROGRESS NOTES
22    Name: Pennie Dillard     :1985      Sex:female       Age : 31 y.o. Chief Complaint   Patient presents with    Hypertension        Hypertension  Pertinent negatives include no chest pain, palpitations or shortness of breath. She followed up with ENT for her feeling of something stuck in her throat. She says the sensation has resolved. She no longer has any problems from an ENT standpoint. She is finally going to quit smoking    She went to see a bariatric specialist to give her some type of medication, however she said that is too far for her to drive out to his office so she is not going to be following up with him. She has a history of hypertension, depression anxiety. She has a history of respiratory allergies    She has her Paps and pelvics done by Dr. Vida Mahm[place]    She refuses further immunizations specifically Pneumovax and varicella. She sees a psychiatrist for her depression and anxiety    Reviewed blood work, from 2022. CBC was good. Lipids were good with exception of an LDL of 104, CMP showed of fasting blood sugar of 109. We will check hemoglobin A1c    Review of Systems   Constitutional: Negative for chills, diaphoresis, fatigue and fever. HENT: Negative for ear pain, postnasal drip, sore throat and trouble swallowing. Eyes: Negative for discharge and itching. Respiratory: Negative for choking, chest tightness and shortness of breath. Cardiovascular: Negative for chest pain, palpitations and leg swelling. Gastrointestinal: Negative for constipation and diarrhea. Genitourinary: Negative for difficulty urinating and dysuria. Hematological: Does not bruise/bleed easily.           Current Outpatient Medications:     medroxyPROGESTERone (DEPO-PROVERA) 150 MG/ML injection, INJECT 1ML INTRAMUSCULARLY EVERY 3 MONTHS, Disp: , Rfl:     mirtazapine (REMERON) 7.5 MG tablet, TAKE ONE TABLET BY MOUTH EVERY NIGHT AS NEEDED FOR SLEEP, Disp: , (Boostrix, Adacel) 04/28/2017        Vitals:    06/22/22 1301   BP: 126/82   Pulse: (!) 120   Temp: 97.4 °F (36.3 °C)   SpO2: 95%   Weight: (!) 377 lb (171 kg)   Height: 5' 10\" (1.778 m)       Physical Exam  Constitutional:       General: She is not in acute distress. Appearance: She is obese. She is not ill-appearing. HENT:      Head: Normocephalic and atraumatic. Right Ear: External ear normal.      Left Ear: External ear normal.   Eyes:      General: No scleral icterus. Right eye: No discharge. Left eye: No discharge. Conjunctiva/sclera: Conjunctivae normal.   Cardiovascular:      Rate and Rhythm: Normal rate and regular rhythm. Heart sounds: No murmur heard. Pulmonary:      Effort: Pulmonary effort is normal. No respiratory distress. Breath sounds: Normal breath sounds. No stridor. No wheezing or rhonchi. Musculoskeletal:      Cervical back: Normal range of motion and neck supple. No tenderness. Lymphadenopathy:      Cervical: No cervical adenopathy. Skin:     General: Skin is warm. Coloration: Skin is not jaundiced or pale. Neurological:      General: No focal deficit present. Mental Status: She is alert. Psychiatric:         Mood and Affect: Mood normal.         Behavior: Behavior normal.         Thought Content: Thought content normal.         Judgment: Judgment normal.          Problem List        Circulatory    Essential hypertension - Primary     Blood pressures are stable. Continue medications and monitor blood pressures at home. Call office if systolics are over 813 over diastolics over 90. Relevant Medications    metoprolol tartrate (LOPRESSOR) 50 MG tablet    lisinopril (PRINIVIL;ZESTRIL) 10 MG tablet       Other    Hyperglycemia       watch diet specifically carbs and sweets. Hemoglobin A1c is 6.1% which is in the prediabetic range.   We will monitor this         Relevant Orders    POCT glycosylated hemoglobin (Hb A1C) (Completed)    Depression       continue medications and follow-up with her psychiatrist         Relevant Medications    gabapentin (NEURONTIN) 300 MG capsule    ALPRAZolam (XANAX) 0.25 MG tablet    citalopram (CELEXA) 20 MG tablet    mirtazapine (REMERON) 7.5 MG tablet    Other hyperlipidemia       watch saturated fats in her diet and will check lipids in 6 months         Relevant Medications    metoprolol tartrate (LOPRESSOR) 50 MG tablet    lisinopril (PRINIVIL;ZESTRIL) 10 MG tablet        6 months    Seen by:   Gregg Jefferson DO

## 2022-06-22 NOTE — ASSESSMENT & PLAN NOTE
Blood pressures are stable. Continue medications and monitor blood pressures at home. Call office if systolics are over 561 over diastolics over 90.

## 2022-09-17 NOTE — ASSESSMENT & PLAN NOTE
[facility] HC drops 3-4 times a day right ear try using a week instead of instilling the drops into the deep part of her auditory canal.  If she has a lot of pain to discontinue these drops. Other

## 2022-10-25 ENCOUNTER — TELEPHONE (OUTPATIENT)
Dept: PRIMARY CARE CLINIC | Age: 37
End: 2022-10-25

## 2022-11-11 ENCOUNTER — PROCEDURE VISIT (OUTPATIENT)
Dept: ENT CLINIC | Age: 37
End: 2022-11-11
Payer: MEDICAID

## 2022-11-11 ENCOUNTER — PROCEDURE VISIT (OUTPATIENT)
Dept: AUDIOLOGY | Age: 37
End: 2022-11-11
Payer: MEDICAID

## 2022-11-11 VITALS
WEIGHT: 293 LBS | HEART RATE: 82 BPM | OXYGEN SATURATION: 99 % | SYSTOLIC BLOOD PRESSURE: 120 MMHG | BODY MASS INDEX: 41.95 KG/M2 | DIASTOLIC BLOOD PRESSURE: 77 MMHG | HEIGHT: 70 IN

## 2022-11-11 DIAGNOSIS — H65.21 RIGHT CHRONIC SEROUS OTITIS MEDIA: ICD-10-CM

## 2022-11-11 DIAGNOSIS — H69.83 ETD (EUSTACHIAN TUBE DYSFUNCTION), BILATERAL: Primary | ICD-10-CM

## 2022-11-11 DIAGNOSIS — H69.81 DYSFUNCTION OF RIGHT EUSTACHIAN TUBE: Primary | ICD-10-CM

## 2022-11-11 PROCEDURE — 92567 TYMPANOMETRY: CPT | Performed by: AUDIOLOGIST

## 2022-11-11 PROCEDURE — 69433 CREATE EARDRUM OPENING: CPT | Performed by: OTOLARYNGOLOGY

## 2022-11-11 NOTE — PROGRESS NOTES
This patient was referred for tympanometric testing by Dr. Carroll Bañuelos due to repeated ear infections. Tympanometry revealed normal middle ear peak pressure and compliance, in the left ear and a flat tympanogram in the right ear. The results were reviewed with the patient. Recommendations for follow up will be made pending physician consult.     Electronically signed by Roger Kidd on 11/11/2022 at 12:57 PM

## 2022-11-11 NOTE — PROGRESS NOTES
Subjective:      Patient ID:  Ramos Beard is a 31 y.o. female. HPI:    Patient presents today for evaluation of right tube. T tube was placed in May 2022 and has since fallen out. She now can't hear out of her right ear and complains of pressure/fullness. Patient's medications, allergies, past medical, surgical, social and family histories were reviewed and updated as appropriate. Review of Systems   Constitutional:  Negative for chills and fever. HENT:  Positive for ear pain and hearing loss. Negative for ear discharge. All other systems reviewed and are negative. Objective:   Physical Exam  Constitutional:       General: She is not in acute distress. Appearance: Normal appearance. HENT:      Head: Normocephalic and atraumatic. Left Ear: Tympanic membrane and ear canal normal.      Ears:      Comments: Right TM dull     Nose: Nose normal.      Mouth/Throat:      Mouth: Mucous membranes are moist.      Pharynx: No oropharyngeal exudate. Eyes:      Extraocular Movements: Extraocular movements intact. Pupils: Pupils are equal, round, and reactive to light. Cardiovascular:      Rate and Rhythm: Normal rate. Pulmonary:      Effort: Pulmonary effort is normal.   Musculoskeletal:         General: Normal range of motion. Cervical back: Normal range of motion and neck supple. Skin:     General: Skin is warm and dry. Neurological:      General: No focal deficit present. Mental Status: She is alert and oriented to person, place, and time.    Psychiatric:         Mood and Affect: Mood normal.         Behavior: Behavior normal.         Procedure Note  Pre-operative Diagnosis: right eustachian tube dysfunction, serous otitis media    Post-operative Diagnosis: same    Anesthesia: phenol topical     Procedure Details:    Pt was consented, placed in the supine position and a microscope was brought in and a speculum was placed in the right ear and the external auditory canal was cleared of cerumen with a currette. With the tympanic membrane visualized, there was not infection and was effusion present. A small cotton swab dipped in phenol topical solution was placed against the anterior-inferior portion of the TM until the membrane turned white. A myringotomy knife was used to make an incision in the anterior-inferior portion of the TM. Effusion was removed with a #3 júnior tip suction until the middle ear space was cleared. A T tube  tube was place in the incision. Condition:  Stable. Patient tolerated procedure well. Complications:          Assessment:       Diagnosis Orders   1. Dysfunction of right eustachian tube        2. Right chronic serous otitis media                   Plan:      - t tube replaced in the office today  - Will write prescription for drops if needed    Follow up as scheduled    Electronically signed by Isa Mayes DO on 11/11/2022 at 1:03 PM                         Lauren Brody  1985    I have discussed the case, including pertinent history and exam findings with the resident. I have seen and examined the patient and the key elements of the encounter have been performed by me. I agree with the assessment, plan and orders as documented by the  resident              Remainder of medical problems as per  resident note. Patient seen and examined. Agree with above exam, assessment and plan.       Electronically signed by Lourdes Durham DO on 11/28/22 at 12:21 PM EST

## 2022-12-21 DIAGNOSIS — I10 ESSENTIAL HYPERTENSION: ICD-10-CM

## 2022-12-21 RX ORDER — LISINOPRIL 10 MG/1
TABLET ORAL
Qty: 90 TABLET | Refills: 0 | Status: SHIPPED | OUTPATIENT
Start: 2022-12-21

## 2023-01-03 ENCOUNTER — OFFICE VISIT (OUTPATIENT)
Dept: PRIMARY CARE CLINIC | Age: 38
End: 2023-01-03
Payer: MEDICAID

## 2023-01-03 VITALS
OXYGEN SATURATION: 98 % | HEIGHT: 70 IN | BODY MASS INDEX: 41.95 KG/M2 | WEIGHT: 293 LBS | SYSTOLIC BLOOD PRESSURE: 124 MMHG | DIASTOLIC BLOOD PRESSURE: 78 MMHG | TEMPERATURE: 97.6 F | HEART RATE: 81 BPM

## 2023-01-03 DIAGNOSIS — R73.9 HYPERGLYCEMIA: ICD-10-CM

## 2023-01-03 DIAGNOSIS — F32.89 OTHER DEPRESSION: ICD-10-CM

## 2023-01-03 DIAGNOSIS — E78.2 MIXED HYPERLIPIDEMIA: ICD-10-CM

## 2023-01-03 DIAGNOSIS — I10 ESSENTIAL HYPERTENSION: Primary | ICD-10-CM

## 2023-01-03 DIAGNOSIS — E01.0 THYROMEGALY: ICD-10-CM

## 2023-01-03 LAB — HBA1C MFR BLD: 6.4 %

## 2023-01-03 PROCEDURE — 3078F DIAST BP <80 MM HG: CPT | Performed by: INTERNAL MEDICINE

## 2023-01-03 PROCEDURE — 3074F SYST BP LT 130 MM HG: CPT | Performed by: INTERNAL MEDICINE

## 2023-01-03 PROCEDURE — G8482 FLU IMMUNIZE ORDER/ADMIN: HCPCS | Performed by: INTERNAL MEDICINE

## 2023-01-03 PROCEDURE — G8427 DOCREV CUR MEDS BY ELIG CLIN: HCPCS | Performed by: INTERNAL MEDICINE

## 2023-01-03 PROCEDURE — G8417 CALC BMI ABV UP PARAM F/U: HCPCS | Performed by: INTERNAL MEDICINE

## 2023-01-03 PROCEDURE — 99214 OFFICE O/P EST MOD 30 MIN: CPT | Performed by: INTERNAL MEDICINE

## 2023-01-03 PROCEDURE — 83036 HEMOGLOBIN GLYCOSYLATED A1C: CPT | Performed by: INTERNAL MEDICINE

## 2023-01-03 PROCEDURE — 4004F PT TOBACCO SCREEN RCVD TLK: CPT | Performed by: INTERNAL MEDICINE

## 2023-01-03 RX ORDER — METOPROLOL TARTRATE 50 MG/1
75 TABLET, FILM COATED ORAL 2 TIMES DAILY
Qty: 270 TABLET | Refills: 1 | Status: SHIPPED | OUTPATIENT
Start: 2023-01-03 | End: 2023-07-02

## 2023-01-03 RX ORDER — LISINOPRIL 10 MG/1
TABLET ORAL
Qty: 90 TABLET | Refills: 1 | Status: SHIPPED | OUTPATIENT
Start: 2023-01-03

## 2023-01-03 ASSESSMENT — ENCOUNTER SYMPTOMS
SHORTNESS OF BREATH: 0
TROUBLE SWALLOWING: 0
SORE THROAT: 0
EYE DISCHARGE: 0
CHOKING: 0
CONSTIPATION: 0
EYE ITCHING: 0
DIARRHEA: 0
CHEST TIGHTNESS: 0

## 2023-01-03 NOTE — PROGRESS NOTES
22    Name: Lilli Naylor     :1985      Sex:female       Age : 30 y.o. Chief Complaint   Patient presents with    Hypertension        Hypertension  Pertinent negatives include no chest pain, palpitations or shortness of breath. Saw ENT specialist and had to have tubes put in her ears again. This is the third time she is at the place. She is still smoking    She went to see a bariatric specialist to give her some type of medication, however she said that is too far for her to drive out to his office so she is not going to be following up with him. She has a history of hypertension, depression and anxiety. She has a history of respiratory allergies    She has her Paps and pelvics done by Dr. Ivonne Donahue in [place]    She refuses further immunizations specifically Pneumovax and varicella. She had her flu shot. She had her Tdap she had her COVID vaccines with 1 booster. She does not want a second booster. She has a history of facial acne but she cannot find a dermatologist who accepts her insurance. She does not want to take antibiotics such as tetracycline on a long-term basis. She sees a psychiatrist for her depression and anxiety. She prescribes alprazolam, citalopram, mirtazapine. Reviewed blood work, from 2022. CBC was good. Lipids were good with exception of an LDL of 104, CMP showed of fasting blood sugar of 109. We will check hemoglobin A1c    Review of Systems   Constitutional:  Negative for chills, diaphoresis, fatigue and fever. HENT:  Negative for ear pain, postnasal drip, sore throat and trouble swallowing. Eyes:  Negative for discharge and itching. Respiratory:  Negative for choking, chest tightness and shortness of breath. Cardiovascular:  Negative for chest pain, palpitations and leg swelling. Gastrointestinal:  Negative for constipation and diarrhea. Genitourinary:  Negative for difficulty urinating and dysuria.    Hematological:  Does not bruise/bleed easily. Current Outpatient Medications:     lisinopril (PRINIVIL;ZESTRIL) 10 MG tablet, TAKE ONE TABLET BY MOUTH DAILY, Disp: 90 tablet, Rfl: 1    metoprolol tartrate (LOPRESSOR) 50 MG tablet, Take 1.5 tablets by mouth 2 times daily, Disp: 270 tablet, Rfl: 1    citalopram (CELEXA) 20 MG tablet, TAKE ONE TABLET BY MOUTH ONCE DAILY, Disp: , Rfl:     ALPRAZolam (XANAX) 0.25 MG tablet, , Disp: , Rfl:     gabapentin (NEURONTIN) 300 MG capsule, TAKE 1 CAPSULE BY MOUTH TWICE DAILY (Patient taking differently: 300 mg 3 times daily.  TAKE 1 CAPSULE BY MOUTH TWICE DAILY), Disp: 60 capsule, Rfl: 5    mirtazapine (REMERON) 7.5 MG tablet, TAKE ONE TABLET BY MOUTH EVERY NIGHT AS NEEDED FOR SLEEP, Disp: , Rfl:   Medications Discontinued During This Encounter   Medication Reason    azelastine (ASTELIN) 0.1 % nasal spray Therapy completed    fluticasone (FLONASE) 50 MCG/ACT nasal spray Therapy completed    medroxyPROGESTERone (DEPO-PROVERA) 150 MG/ML injection Therapy completed    medroxyPROGESTERone (DEPO-PROVERA) 150 MG/ML injection Therapy completed    metoprolol tartrate (LOPRESSOR) 50 MG tablet REORDER    lisinopril (PRINIVIL;ZESTRIL) 10 MG tablet REORDER       Allergies   Allergen Reactions    No Known Allergies        Past Medical History:   Diagnosis Date    Anxiety     Class 3 severe obesity due to excess calories without serious comorbidity with body mass index (BMI) of 50.0 to 59.9 in adult [facility])     Primary hypertension       Immunization History   Administered Date(s) Administered    COVID-19, MODERNA BLUE border, Primary or Immunocompromised, (age 12y+), IM, 100 mcg/0.5mL 03/26/2021, 04/23/2021    COVID-19, MODERNA Booster BLUE border, (age 18y+), IM, 50mcg/0.25mL 03/17/2022    Influenza Vaccine, unspecified formulation 10/04/2018    Influenza, FLUARIX, FLULAVAL, FLUZONE (age 10 mo+) AND AFLURIA, (age 1 y+), PF, 0.5mL 10/04/2018, 11/11/2019    Influenza, FLUCELVAX, (age 10 mo+), MDCK, MDV, 0.5mL 12/13/2022    Influenza, FLUCELVAX, (age 10 mo+), MDCK, PF, 0.5mL 09/29/2021    Tdap (Boostrix, Adacel) 04/28/2017        Vitals:    01/03/23 1504   BP: 124/78   Pulse: 81   Temp: 97.6 °F (36.4 °C)   SpO2: 98%   Weight: (!) 382 lb (173.3 kg)   Height: 5' 10\" (1.778 m)       Physical Exam  Constitutional:       General: She is not in acute distress. Appearance: She is obese. She is not ill-appearing. Comments: Morbidly obese with BMI of 54.81   HENT:      Head: Normocephalic and atraumatic. Right Ear: External ear normal.      Left Ear: External ear normal.   Eyes:      General: No scleral icterus. Right eye: No discharge. Left eye: No discharge. Conjunctiva/sclera: Conjunctivae normal.   Neck:      Comments: Mild thyromegaly  Cardiovascular:      Rate and Rhythm: Normal rate and regular rhythm. Heart sounds: No murmur heard. Pulmonary:      Effort: Pulmonary effort is normal. No respiratory distress. Breath sounds: Normal breath sounds. No stridor. No wheezing or rhonchi. Musculoskeletal:      Cervical back: Normal range of motion and neck supple. No tenderness. Lymphadenopathy:      Cervical: No cervical adenopathy. Skin:     General: Skin is warm. Coloration: Skin is not jaundiced or pale. Comments: Facial acne   Neurological:      General: No focal deficit present. Mental Status: She is alert. Psychiatric:         Mood and Affect: Mood normal.         Behavior: Behavior normal.         Thought Content: Thought content normal.         Judgment: Judgment normal.        Problem List          Circulatory    Essential hypertension - Primary       at goal, continue lisinopril 10 mg a day and metoprolol tartrate 75 mg twice daily. Prescription written for both.   Check fasting CMP         Relevant Medications    lisinopril (PRINIVIL;ZESTRIL) 10 MG tablet    metoprolol tartrate (LOPRESSOR) 50 MG tablet    Other Relevant Orders    Comprehensive Metabolic Panel       Endocrine    Thyromegaly       check TSH and free T4         Relevant Orders    T4, Free    TSH       Other    Hyperglycemia       hemoglobin A1c was high at 6.4% which is the prediabetic range. Patient was informed of this and told to watch her sweets and carbs in her diet and try and lose some weight.   If it goes above 6.5% then she will need placed on metformin         Relevant Orders    POCT glycosylated hemoglobin (Hb A1C) (Completed)    Comprehensive Metabolic Panel    Depression       follow-up with her psychiatrist         Relevant Medications    gabapentin (NEURONTIN) 300 MG capsule    ALPRAZolam (XANAX) 0.25 MG tablet    citalopram (CELEXA) 20 MG tablet    mirtazapine (REMERON) 7.5 MG tablet    Other Relevant Orders    Comprehensive Metabolic Panel    CBC with Auto Differential    Mixed hyperlipidemia     Not at goal watch saturated fats in diet and will monitor lipids          Relevant Medications    lisinopril (PRINIVIL;ZESTRIL) 10 MG tablet    metoprolol tartrate (LOPRESSOR) 50 MG tablet    Other Relevant Orders    Lipid Panel     6 months    Seen by:   Lanell Gottron, DO

## 2023-01-03 NOTE — ASSESSMENT & PLAN NOTE
hemoglobin A1c was high at 6.4% which is the prediabetic range. Patient was informed of this and told to watch her sweets and carbs in her diet and try and lose some weight.   If it goes above 6.5% then she will need placed on metformin

## 2023-01-03 NOTE — ASSESSMENT & PLAN NOTE
at goal, continue lisinopril 10 mg a day and metoprolol tartrate 75 mg twice daily. Prescription written for both.   Check fasting CMP

## 2023-01-06 DIAGNOSIS — K04.7 DENTAL ABSCESS: Primary | ICD-10-CM

## 2023-01-06 RX ORDER — AMOXICILLIN 500 MG/1
500 CAPSULE ORAL 2 TIMES DAILY
Qty: 14 CAPSULE | Refills: 0 | Status: SHIPPED | OUTPATIENT
Start: 2023-01-06 | End: 2023-01-13

## 2023-01-13 DIAGNOSIS — B37.31 CANDIDA VAGINITIS: Primary | ICD-10-CM

## 2023-01-13 RX ORDER — FLUCONAZOLE 100 MG/1
100 TABLET ORAL DAILY
Qty: 3 TABLET | Refills: 0 | Status: SHIPPED | OUTPATIENT
Start: 2023-01-13 | End: 2023-01-16

## 2023-03-02 ENCOUNTER — TELEPHONE (OUTPATIENT)
Dept: ADMINISTRATIVE | Age: 38
End: 2023-03-02

## 2023-03-02 NOTE — TELEPHONE ENCOUNTER
Called patient in regards to scheduling appointment for right ear tube fell out ringing in ear,decreased hearing.

## 2023-03-02 NOTE — TELEPHONE ENCOUNTER
Patient requesting appointment with Dr. Pro Guerra or first available provider as soon as possible for right ear. Patient needs tube in right ear.  Please advise scheduleing

## 2023-03-08 ENCOUNTER — OFFICE VISIT (OUTPATIENT)
Dept: ENT CLINIC | Age: 38
End: 2023-03-08
Payer: MEDICAID

## 2023-03-08 ENCOUNTER — PROCEDURE VISIT (OUTPATIENT)
Dept: AUDIOLOGY | Age: 38
End: 2023-03-08
Payer: MEDICAID

## 2023-03-08 VITALS
RESPIRATION RATE: 14 BRPM | OXYGEN SATURATION: 100 % | TEMPERATURE: 98.8 F | BODY MASS INDEX: 41.95 KG/M2 | WEIGHT: 293 LBS | DIASTOLIC BLOOD PRESSURE: 87 MMHG | SYSTOLIC BLOOD PRESSURE: 124 MMHG | HEART RATE: 89 BPM | HEIGHT: 70 IN

## 2023-03-08 DIAGNOSIS — H69.83 ETD (EUSTACHIAN TUBE DYSFUNCTION), BILATERAL: Primary | ICD-10-CM

## 2023-03-08 DIAGNOSIS — H69.83 ETD (EUSTACHIAN TUBE DYSFUNCTION), BILATERAL: ICD-10-CM

## 2023-03-08 DIAGNOSIS — H69.81 DYSFUNCTION OF RIGHT EUSTACHIAN TUBE: Primary | ICD-10-CM

## 2023-03-08 PROCEDURE — 69433 CREATE EARDRUM OPENING: CPT | Performed by: OTOLARYNGOLOGY

## 2023-03-08 PROCEDURE — 99213 OFFICE O/P EST LOW 20 MIN: CPT | Performed by: OTOLARYNGOLOGY

## 2023-03-08 PROCEDURE — G8427 DOCREV CUR MEDS BY ELIG CLIN: HCPCS | Performed by: OTOLARYNGOLOGY

## 2023-03-08 PROCEDURE — 3074F SYST BP LT 130 MM HG: CPT | Performed by: OTOLARYNGOLOGY

## 2023-03-08 PROCEDURE — 3079F DIAST BP 80-89 MM HG: CPT | Performed by: OTOLARYNGOLOGY

## 2023-03-08 PROCEDURE — G8417 CALC BMI ABV UP PARAM F/U: HCPCS | Performed by: OTOLARYNGOLOGY

## 2023-03-08 PROCEDURE — 92567 TYMPANOMETRY: CPT | Performed by: AUDIOLOGIST

## 2023-03-08 PROCEDURE — 4004F PT TOBACCO SCREEN RCVD TLK: CPT | Performed by: OTOLARYNGOLOGY

## 2023-03-08 PROCEDURE — G8482 FLU IMMUNIZE ORDER/ADMIN: HCPCS | Performed by: OTOLARYNGOLOGY

## 2023-03-08 RX ORDER — FLUTICASONE PROPIONATE 50 MCG
2 SPRAY, SUSPENSION (ML) NASAL DAILY
Qty: 16 G | Refills: 3 | Status: SHIPPED | OUTPATIENT
Start: 2023-03-08

## 2023-03-08 RX ORDER — CIPROFLOXACIN AND DEXAMETHASONE 3; 1 MG/ML; MG/ML
3 SUSPENSION/ DROPS AURICULAR (OTIC) 3 TIMES DAILY
Qty: 7.5 ML | Refills: 3 | Status: SHIPPED | OUTPATIENT
Start: 2023-03-08 | End: 2023-03-15

## 2023-03-08 NOTE — PROGRESS NOTES
Subjective:      Patient ID:  Ernestina Parra is a 30 y.o. female. HPI:    Patient presents today for right ear recheck. Condition has been present for 2 month(s). Right tube feel out and ear is bad again. Past Medical History:   Diagnosis Date    Anxiety     Class 3 severe obesity due to excess calories without serious comorbidity with body mass index (BMI) of 50.0 to 59.9 in adult [facility])     Primary hypertension      Past Surgical History:   Procedure Laterality Date    TYMPANOSTOMY TUBE PLACEMENT       Family History   Problem Relation Age of Onset    High Blood Pressure Father     Diabetes Father      Social History     Socioeconomic History    Marital status: Single     Spouse name: None    Number of children: None    Years of education: None    Highest education level: None   Tobacco Use    Smoking status: Every Day     Packs/day: 0.50     Types: Cigarettes    Smokeless tobacco: Never    Tobacco comments:     trying to quit   Substance and Sexual Activity    Alcohol use: Not Currently    Drug use: Not Currently     Social Determinants of Health     Financial Resource Strain: Low Risk     Difficulty of Paying Living Expenses: Not very hard   Food Insecurity: No Food Insecurity    Worried About Running Out of Food in the Last Year: Never true    Ran Out of Food in the Last Year: Never true     Allergies   Allergen Reactions    No Known Allergies        Review of Systems   Constitutional:  Negative for chills and fever. HENT:  Positive for ear pain and hearing loss. Negative for ear discharge. All other systems reviewed and are negative. Objective:     Vitals:    03/08/23 1418   BP: 124/87   Pulse: 89   Resp: 14   Temp: 98.8 °F (37.1 °C)   SpO2: 100%     Physical Exam  Vitals and nursing note reviewed. Constitutional:       General: She is not in acute distress. Appearance: Normal appearance. She is well-developed. HENT:      Head: Normocephalic and atraumatic.       Right Ear: Ear canal normal. A middle ear effusion is present. Tympanic membrane is retracted. Left Ear: Tympanic membrane and ear canal normal.      Ears:      Comments: Right TM dull     Nose: Nose normal.      Mouth/Throat:      Mouth: Mucous membranes are moist.      Pharynx: Uvula midline. No oropharyngeal exudate. Eyes:      Extraocular Movements: Extraocular movements intact. Conjunctiva/sclera: Conjunctivae normal.      Pupils: Pupils are equal, round, and reactive to light. Cardiovascular:      Rate and Rhythm: Normal rate and regular rhythm. Heart sounds: Normal heart sounds. Pulmonary:      Effort: Pulmonary effort is normal.      Breath sounds: Normal breath sounds. Abdominal:      General: Bowel sounds are normal.      Palpations: Abdomen is soft. Musculoskeletal:         General: Normal range of motion. Cervical back: Normal range of motion and neck supple. Skin:     General: Skin is warm and dry. Neurological:      General: No focal deficit present. Mental Status: She is alert and oriented to person, place, and time. Psychiatric:         Mood and Affect: Mood normal.         Behavior: Behavior normal.     Procedure Note  Pre-operative Diagnosis: right ear myringotomy with tube    Post-operative Diagnosis: same    Anesthesia: phenol topical     Procedure Details:    Pt was consented, placed in the supine position and a microscope was brought in and a speculum was placed in the right ear and the external auditory canal was cleared of cerumen with a currette. With the tympanic membrane visualized, there was not infection and was effusion present. A small cotton swab dipped in phenol topical solution was placed against the anterior-inferior portion of the TM until the membrane turned white. A myringotomy knife was used to make an incision in the anterior-inferior portion of the TM. Effusion was removed with a #3 júnior tip suction until the middle ear space was cleared.   A T tube was place in the incision. Condition:  Stable. Patient tolerated procedure well. Complications:            Assessment:       Diagnosis Orders   1. Dysfunction of right eustachian tube        2.  ETD (Eustachian tube dysfunction), bilateral                   Plan:      I will recheck in 3 months to seeif the tube is still in place  Follow up in 3 month(s)

## 2023-03-08 NOTE — PROGRESS NOTES
This patient was referred for audiometric/tympanometric testing by Dr. Thompson [facility] due to recurrent ear infections in right ear. Patient has history of many PE tubes. Tympanometry revealed flat tympanogram, in the right ear and normal middle ear peak pressure and compliance, in the left ear. The results were reviewed with the patient and physician. Recommendations for follow up will be made pending physician consult. DEMETRIO Webster.   Audiology Intern (4th Year)     Arvind Hill, Kenneth/CCC-A  [place] Lic # [id number]

## 2023-07-04 DIAGNOSIS — I10 ESSENTIAL HYPERTENSION: ICD-10-CM

## 2023-07-05 RX ORDER — LISINOPRIL 10 MG/1
TABLET ORAL
Qty: 90 TABLET | Refills: 1 | Status: SHIPPED | OUTPATIENT
Start: 2023-07-05

## 2023-07-05 NOTE — TELEPHONE ENCOUNTER
Last Appointment:  1/3/2023  Future Appointments   Date Time Provider [address]   7/20/2023  9:00 AM Gómez Maggie DO Frida Brooke [facility]   7/21/2023  8:30 AM Phyllis Saeed, [address] [place] [address]

## 2023-07-19 ASSESSMENT — PATIENT HEALTH QUESTIONNAIRE - PHQ9
6. FEELING BAD ABOUT YOURSELF - OR THAT YOU ARE A FAILURE OR HAVE LET YOURSELF OR YOUR FAMILY DOWN: 0
5. POOR APPETITE OR OVEREATING: 0
SUM OF ALL RESPONSES TO PHQ QUESTIONS 1-9: 0
7. TROUBLE CONCENTRATING ON THINGS, SUCH AS READING THE NEWSPAPER OR WATCHING TELEVISION: NOT AT ALL
SUM OF ALL RESPONSES TO PHQ QUESTIONS 1-9: 0
2. FEELING DOWN, DEPRESSED OR HOPELESS: NOT AT ALL
6. FEELING BAD ABOUT YOURSELF - OR THAT YOU ARE A FAILURE OR HAVE LET YOURSELF OR YOUR FAMILY DOWN: NOT AT ALL
8. MOVING OR SPEAKING SO SLOWLY THAT OTHER PEOPLE COULD HAVE NOTICED. OR THE OPPOSITE - BEING SO FIDGETY OR RESTLESS THAT YOU HAVE BEEN MOVING AROUND A LOT MORE THAN USUAL: NOT AT ALL
3. TROUBLE FALLING OR STAYING ASLEEP: NOT AT ALL
4. FEELING TIRED OR HAVING LITTLE ENERGY: NOT AT ALL
7. TROUBLE CONCENTRATING ON THINGS, SUCH AS READING THE NEWSPAPER OR WATCHING TELEVISION: 0
1. LITTLE INTEREST OR PLEASURE IN DOING THINGS: 0
1. LITTLE INTEREST OR PLEASURE IN DOING THINGS: NOT AT ALL
3. TROUBLE FALLING OR STAYING ASLEEP: 0
SUM OF ALL RESPONSES TO PHQ QUESTIONS 1-9: 0
SUM OF ALL RESPONSES TO PHQ QUESTIONS 1-9: 0
9. THOUGHTS THAT YOU WOULD BE BETTER OFF DEAD, OR OF HURTING YOURSELF: NOT AT ALL
10. IF YOU CHECKED OFF ANY PROBLEMS, HOW DIFFICULT HAVE THESE PROBLEMS MADE IT FOR YOU TO DO YOUR WORK, TAKE CARE OF THINGS AT HOME, OR GET ALONG WITH OTHER PEOPLE: NOT DIFFICULT AT ALL
8. MOVING OR SPEAKING SO SLOWLY THAT OTHER PEOPLE COULD HAVE NOTICED. OR THE OPPOSITE, BEING SO FIGETY OR RESTLESS THAT YOU HAVE BEEN MOVING AROUND A LOT MORE THAN USUAL: 0
10. IF YOU CHECKED OFF ANY PROBLEMS, HOW DIFFICULT HAVE THESE PROBLEMS MADE IT FOR YOU TO DO YOUR WORK, TAKE CARE OF THINGS AT HOME, OR GET ALONG WITH OTHER PEOPLE: 0
4. FEELING TIRED OR HAVING LITTLE ENERGY: 0
9. THOUGHTS THAT YOU WOULD BE BETTER OFF DEAD, OR OF HURTING YOURSELF: 0
5. POOR APPETITE OR OVEREATING: NOT AT ALL
2. FEELING DOWN, DEPRESSED OR HOPELESS: 0
SUM OF ALL RESPONSES TO PHQ9 QUESTIONS 1 & 2: 0
SUM OF ALL RESPONSES TO PHQ QUESTIONS 1-9: 0

## 2023-07-20 ENCOUNTER — OFFICE VISIT (OUTPATIENT)
Dept: PRIMARY CARE CLINIC | Age: 38
End: 2023-07-20
Payer: MEDICAID

## 2023-07-20 VITALS
HEIGHT: 70 IN | SYSTOLIC BLOOD PRESSURE: 128 MMHG | BODY MASS INDEX: 41.95 KG/M2 | WEIGHT: 293 LBS | OXYGEN SATURATION: 99 % | TEMPERATURE: 97.1 F | HEART RATE: 89 BPM | DIASTOLIC BLOOD PRESSURE: 86 MMHG

## 2023-07-20 DIAGNOSIS — Z11.59 NEED FOR HEPATITIS C SCREENING TEST: ICD-10-CM

## 2023-07-20 DIAGNOSIS — Z23 NEED FOR VACCINATION WITH 20-POLYVALENT PNEUMOCOCCAL CONJUGATE VACCINE: ICD-10-CM

## 2023-07-20 DIAGNOSIS — E66.01 CLASS 3 SEVERE OBESITY DUE TO EXCESS CALORIES WITHOUT SERIOUS COMORBIDITY WITH BODY MASS INDEX (BMI) OF 50.0 TO 59.9 IN ADULT (HCC): ICD-10-CM

## 2023-07-20 DIAGNOSIS — E01.0 THYROMEGALY: ICD-10-CM

## 2023-07-20 DIAGNOSIS — Z11.4 SCREENING FOR HIV (HUMAN IMMUNODEFICIENCY VIRUS): ICD-10-CM

## 2023-07-20 DIAGNOSIS — I10 ESSENTIAL HYPERTENSION: ICD-10-CM

## 2023-07-20 DIAGNOSIS — I10 ESSENTIAL HYPERTENSION: Primary | ICD-10-CM

## 2023-07-20 DIAGNOSIS — E78.2 MIXED HYPERLIPIDEMIA: ICD-10-CM

## 2023-07-20 DIAGNOSIS — R73.9 HYPERGLYCEMIA: ICD-10-CM

## 2023-07-20 DIAGNOSIS — Z00.00 ANNUAL PHYSICAL EXAM: ICD-10-CM

## 2023-07-20 DIAGNOSIS — Z12.31 BREAST CANCER SCREENING BY MAMMOGRAM: ICD-10-CM

## 2023-07-20 LAB
ALBUMIN SERPL-MCNC: 4.2 G/DL (ref 3.5–5.2)
ALP BLD-CCNC: 55 U/L (ref 35–104)
ALT SERPL-CCNC: 28 U/L (ref 0–32)
ANION GAP SERPL CALCULATED.3IONS-SCNC: 15 MMOL/L (ref 7–16)
AST SERPL-CCNC: 22 U/L (ref 0–31)
BILIRUB SERPL-MCNC: 0.4 MG/DL (ref 0–1.2)
BUN BLDV-MCNC: 15 MG/DL (ref 6–20)
CALCIUM SERPL-MCNC: 9.4 MG/DL (ref 8.6–10.2)
CHLORIDE BLD-SCNC: 105 MMOL/L (ref 98–107)
CHOLESTEROL: 158 MG/DL
CO2: 22 MMOL/L (ref 22–29)
CREAT SERPL-MCNC: 0.7 MG/DL (ref 0.5–1)
GFR SERPL CREATININE-BSD FRML MDRD: >60 ML/MIN/1.73M2
GLUCOSE BLD-MCNC: 96 MG/DL (ref 74–99)
HDLC SERPL-MCNC: 29 MG/DL
LDL CHOLESTEROL: 94 MG/DL
POTASSIUM SERPL-SCNC: 4.2 MMOL/L (ref 3.5–5)
SODIUM BLD-SCNC: 142 MMOL/L (ref 132–146)
TOTAL PROTEIN: 7.4 G/DL (ref 6.4–8.3)
TRIGL SERPL-MCNC: 176 MG/DL
TSH SERPL DL<=0.05 MIU/L-ACNC: 1.59 UIU/ML (ref 0.27–4.2)
VLDLC SERPL CALC-MCNC: 35 MG/DL

## 2023-07-20 PROCEDURE — 90471 IMMUNIZATION ADMIN: CPT | Performed by: INTERNAL MEDICINE

## 2023-07-20 PROCEDURE — 90677 PCV20 VACCINE IM: CPT | Performed by: INTERNAL MEDICINE

## 2023-07-20 PROCEDURE — 99395 PREV VISIT EST AGE 18-39: CPT | Performed by: INTERNAL MEDICINE

## 2023-07-20 PROCEDURE — 3079F DIAST BP 80-89 MM HG: CPT | Performed by: INTERNAL MEDICINE

## 2023-07-20 PROCEDURE — 3074F SYST BP LT 130 MM HG: CPT | Performed by: INTERNAL MEDICINE

## 2023-07-20 RX ORDER — METOPROLOL TARTRATE 50 MG/1
75 TABLET, FILM COATED ORAL 2 TIMES DAILY
Qty: 270 TABLET | Refills: 1 | Status: SHIPPED | OUTPATIENT
Start: 2023-07-20 | End: 2024-01-16

## 2023-07-20 SDOH — ECONOMIC STABILITY: INCOME INSECURITY: HOW HARD IS IT FOR YOU TO PAY FOR THE VERY BASICS LIKE FOOD, HOUSING, MEDICAL CARE, AND HEATING?: NOT HARD AT ALL

## 2023-07-20 SDOH — ECONOMIC STABILITY: FOOD INSECURITY: WITHIN THE PAST 12 MONTHS, THE FOOD YOU BOUGHT JUST DIDN'T LAST AND YOU DIDN'T HAVE MONEY TO GET MORE.: NEVER TRUE

## 2023-07-20 SDOH — ECONOMIC STABILITY: FOOD INSECURITY: WITHIN THE PAST 12 MONTHS, YOU WORRIED THAT YOUR FOOD WOULD RUN OUT BEFORE YOU GOT MONEY TO BUY MORE.: NEVER TRUE

## 2023-07-20 SDOH — ECONOMIC STABILITY: HOUSING INSECURITY
IN THE LAST 12 MONTHS, WAS THERE A TIME WHEN YOU DID NOT HAVE A STEADY PLACE TO SLEEP OR SLEPT IN A SHELTER (INCLUDING NOW)?: NO

## 2023-07-20 ASSESSMENT — ENCOUNTER SYMPTOMS
CHEST TIGHTNESS: 0
SORE THROAT: 0
EYE ITCHING: 0
CONSTIPATION: 0
ROS SKIN COMMENTS: FACIAL ACNE
CHOKING: 0
TROUBLE SWALLOWING: 0
SHORTNESS OF BREATH: 0
DIARRHEA: 0
EYE DISCHARGE: 0

## 2023-07-20 NOTE — PROGRESS NOTES
22    Name: Jared Calvillo     :1985      Sex:female       Age : 30 y.o. Chief Complaint   Patient presents with    Hypertension    Annual Exam        Hypertension  Pertinent negatives include no chest pain, palpitations or shortness of breath. Here for annual exam.        Saw ENT specialist and had to have tubes put in her ears again. This is the third time she is at the place. She follows up with Dr. Andersen Mouse    She is still smoking    She went to see a bariatric specialist to give her some type of medication, however she said that is too far for her to drive out to his office so she is not going to be following up with him. She asked me about injectables like Ozempic. Medic but she is morbidly obese. She may qualify we will check    She has a history of hypertension, depression and anxiety. She has a history of respiratory allergies    She will have a Pap and pelvic done by nurse practitioner at Dr. Viridiana Prieto office. With her obesity and acne I wonder if she has PCOS. She refuses further immunizations specifically varicella. She had her flu shot. She had her Tdap she had her COVID vaccines with 1 booster. She does not want a second booster. She is agreeable to getting a Prevnar 20    She has a history of facial acne but she cannot find a dermatologist who accepts her insurance. She does not want to take antibiotics such as tetracycline on a long-term basis. She sees a psychiatrist for her depression and anxiety. She prescribes alprazolam, citalopram, mirtazapine. Reviewed blood work, hemoglobin A1c in 2023 was 6.4%. In 2022 her LDL cholesterol was 104, rest of lipids were good. Fasting blood sugar was 109 rest of CMP was good. Grey Corrente CBC was acceptable    Review of Systems   Constitutional:  Negative for chills, diaphoresis, fatigue and fever. HENT:  Negative for ear pain, postnasal drip, sore throat and trouble swallowing.     Eyes:  Negative for

## 2023-07-20 NOTE — ASSESSMENT & PLAN NOTE
patient saw bariatric doctor but cannot go back is due for. She does not want any bariatric surgery. We discussed other methods for weight loss. She would like to try GLP-1 inhibitors. I do not know she is a candidate but she is going to check her insurance.

## 2023-07-20 NOTE — ASSESSMENT & PLAN NOTE
morbidly obese patient who will be following up with GYN. Wants to lose weight and wants to try Ozempic. We will see if she is a candidate that she is not diabetic. She is prediabetic with a hemoglobin A1c of 6.4%. Will need other health maintenance issues addressed today.

## 2023-07-21 ENCOUNTER — PROCEDURE VISIT (OUTPATIENT)
Dept: AUDIOLOGY | Age: 38
End: 2023-07-21

## 2023-07-21 ENCOUNTER — OFFICE VISIT (OUTPATIENT)
Dept: ENT CLINIC | Age: 38
End: 2023-07-21
Payer: MEDICAID

## 2023-07-21 VITALS — HEIGHT: 70 IN | BODY MASS INDEX: 41.95 KG/M2 | WEIGHT: 293 LBS

## 2023-07-21 DIAGNOSIS — H69.81 DYSFUNCTION OF RIGHT EUSTACHIAN TUBE: Primary | ICD-10-CM

## 2023-07-21 DIAGNOSIS — H69.83 ETD (EUSTACHIAN TUBE DYSFUNCTION), BILATERAL: ICD-10-CM

## 2023-07-21 DIAGNOSIS — H69.81 ETD (EUSTACHIAN TUBE DYSFUNCTION), RIGHT: Primary | ICD-10-CM

## 2023-07-21 LAB
HBA1C MFR BLD: 5.8 % (ref 4–5.6)
HEPATITIS C ANTIBODY: NONREACTIVE
HIV AG/AB: NONREACTIVE

## 2023-07-21 PROCEDURE — G8427 DOCREV CUR MEDS BY ELIG CLIN: HCPCS | Performed by: OTOLARYNGOLOGY

## 2023-07-21 PROCEDURE — 99213 OFFICE O/P EST LOW 20 MIN: CPT | Performed by: OTOLARYNGOLOGY

## 2023-07-21 PROCEDURE — G8417 CALC BMI ABV UP PARAM F/U: HCPCS | Performed by: OTOLARYNGOLOGY

## 2023-07-21 PROCEDURE — 4004F PT TOBACCO SCREEN RCVD TLK: CPT | Performed by: OTOLARYNGOLOGY

## 2023-07-21 RX ORDER — FLUTICASONE PROPIONATE 50 MCG
2 SPRAY, SUSPENSION (ML) NASAL DAILY
Qty: 16 G | Refills: 3 | Status: SHIPPED | OUTPATIENT
Start: 2023-07-21

## 2023-07-21 ASSESSMENT — ENCOUNTER SYMPTOMS
CHEST TIGHTNESS: 0
EYES NEGATIVE: 1
EYE DISCHARGE: 0
SHORTNESS OF BREATH: 0
EYE PAIN: 0
COLOR CHANGE: 0
ABDOMINAL PAIN: 0
VOMITING: 0
GASTROINTESTINAL NEGATIVE: 1
DIARRHEA: 0
RESPIRATORY NEGATIVE: 1
APNEA: 0

## 2023-07-21 NOTE — PROGRESS NOTES
This patient was referred for audiometric/tympanometric testing by Dr. Trey Cox due to eustachian tube dysfunction of the right ear. Tympanometry revealed normal middle ear peak pressure and compliance, in the left ear and a flat tympanogram, in the right ear. The results were reviewed with the patient and physician. Recommendations for follow up will be made pending physician consult.     Kenneth Cox/CCC-A  [place] Lic # [id number]

## 2023-07-21 NOTE — PROGRESS NOTES
Subjective:      Patient ID:  Warden Bowens is a 30 y.o. female. HPI:    Patient presents today for right ear check. Condition has been present for 1 month(s). Pt is doing ok. Past Medical History:   Diagnosis Date    Anxiety     Class 3 severe obesity due to excess calories without serious comorbidity with body mass index (BMI) of 50.0 to 59.9 in adult [facility])     Primary hypertension      Past Surgical History:   Procedure Laterality Date    TYMPANOSTOMY TUBE PLACEMENT       Family History   Problem Relation Age of Onset    High Blood Pressure Father     Diabetes Father      Social History     Socioeconomic History    Marital status: Single     Spouse name: None    Number of children: None    Years of education: None    Highest education level: None   Tobacco Use    Smoking status: Every Day     Packs/day: 0.50     Types: Cigarettes    Smokeless tobacco: Never    Tobacco comments:     trying to quit   Substance and Sexual Activity    Alcohol use: Not Currently    Drug use: Not Currently     Social Determinants of Health     Financial Resource Strain: Low Risk     Difficulty of Paying Living Expenses: Not hard at all   Food Insecurity: No Food Insecurity    Worried About Running Out of Food in the Last Year: Never true    Ran Out of Food in the Last Year: Never true   Transportation Needs: Unknown    Lack of Transportation (Non-Medical): No   Housing Stability: Unknown    Unstable Housing in the Last Year: No     Allergies   Allergen Reactions    No Known Allergies        Review of Systems   Constitutional: Negative. Negative for appetite change, chills and fever. HENT:  Positive for ear pain and hearing loss. Negative for ear discharge. Eyes: Negative. Negative for pain, discharge and visual disturbance. Respiratory: Negative. Negative for apnea, chest tightness and shortness of breath. Cardiovascular: Negative. Negative for chest pain, palpitations and leg swelling.    Gastrointestinal:

## 2023-08-01 DIAGNOSIS — N60.02 CYST OF LEFT BREAST: Primary | ICD-10-CM

## 2023-08-03 DIAGNOSIS — N60.02 CYST OF LEFT BREAST: Primary | ICD-10-CM

## 2023-08-19 PROBLEM — Z00.00 ANNUAL PHYSICAL EXAM: Status: RESOLVED | Noted: 2023-07-20 | Resolved: 2023-08-19

## 2023-08-19 PROBLEM — Z11.4 SCREENING FOR HIV (HUMAN IMMUNODEFICIENCY VIRUS): Status: RESOLVED | Noted: 2023-07-20 | Resolved: 2023-08-19

## 2023-08-19 PROBLEM — Z11.59 NEED FOR HEPATITIS C SCREENING TEST: Status: RESOLVED | Noted: 2023-07-20 | Resolved: 2023-08-19

## 2023-09-07 ENCOUNTER — OFFICE VISIT (OUTPATIENT)
Dept: FAMILY MEDICINE CLINIC | Age: 38
End: 2023-09-07
Payer: MEDICAID

## 2023-09-07 VITALS
TEMPERATURE: 97.3 F | BODY MASS INDEX: 41.95 KG/M2 | DIASTOLIC BLOOD PRESSURE: 86 MMHG | WEIGHT: 293 LBS | OXYGEN SATURATION: 97 % | HEART RATE: 70 BPM | HEIGHT: 70 IN | SYSTOLIC BLOOD PRESSURE: 120 MMHG | RESPIRATION RATE: 16 BRPM

## 2023-09-07 DIAGNOSIS — R30.0 DYSURIA: ICD-10-CM

## 2023-09-07 DIAGNOSIS — N30.01 ACUTE CYSTITIS WITH HEMATURIA: Primary | ICD-10-CM

## 2023-09-07 DIAGNOSIS — B37.31 VAGINAL CANDIDA: ICD-10-CM

## 2023-09-07 LAB
BILIRUBIN, POC: NORMAL
BLOOD URINE, POC: NORMAL
CLARITY, POC: CLEAR
COLOR, POC: YELLOW
CONTROL: NORMAL
GLUCOSE URINE, POC: NORMAL
KETONES, POC: NORMAL
LEUKOCYTE EST, POC: NORMAL
NITRITE, POC: POSITIVE
PH, POC: 5.5
PREGNANCY TEST URINE, POC: NORMAL
PROTEIN, POC: 30
SPECIFIC GRAVITY, POC: 1.03
UROBILINOGEN, POC: 0.2

## 2023-09-07 PROCEDURE — G8427 DOCREV CUR MEDS BY ELIG CLIN: HCPCS | Performed by: NURSE PRACTITIONER

## 2023-09-07 PROCEDURE — 99214 OFFICE O/P EST MOD 30 MIN: CPT | Performed by: NURSE PRACTITIONER

## 2023-09-07 PROCEDURE — 3074F SYST BP LT 130 MM HG: CPT | Performed by: NURSE PRACTITIONER

## 2023-09-07 PROCEDURE — 4004F PT TOBACCO SCREEN RCVD TLK: CPT | Performed by: NURSE PRACTITIONER

## 2023-09-07 PROCEDURE — G8417 CALC BMI ABV UP PARAM F/U: HCPCS | Performed by: NURSE PRACTITIONER

## 2023-09-07 PROCEDURE — 3079F DIAST BP 80-89 MM HG: CPT | Performed by: NURSE PRACTITIONER

## 2023-09-07 RX ORDER — NITROFURANTOIN 25; 75 MG/1; MG/1
100 CAPSULE ORAL 2 TIMES DAILY
Qty: 14 CAPSULE | Refills: 0 | Status: SHIPPED | OUTPATIENT
Start: 2023-09-07

## 2023-09-07 RX ORDER — FLUCONAZOLE 150 MG/1
150 TABLET ORAL ONCE
Qty: 1 TABLET | Refills: 0 | Status: SHIPPED | OUTPATIENT
Start: 2023-09-07 | End: 2023-09-07

## 2023-09-07 NOTE — PROGRESS NOTES
Subjective:  Chief Complaint   Patient presents with    Urinary Pain    Urinary Frequency       HPI: The patient states that they have had dysuria and urinary frequency for the last 2-3 days. patient does not feel like she is emptying her bladder. Denies back or flank pain. The patient does admit to suprapubic pressure. The patient has had urgency and but denies gross hematuria. The patient denies any abdominal or flank pain. The patient denies nausea and vomiting. No fevers or chills. No prior history of kidney stones. The patient has a history of UTI's and states that this feels the same. They come to the urgent care for evaluation. ROS:  Positive and pertinent negatives as per HPI. All other systems are reviewed and negative. Current Outpatient Medications:     nitrofurantoin, macrocrystal-monohydrate, (MACROBID) 100 MG capsule, Take 1 capsule by mouth 2 times daily, Disp: 14 capsule, Rfl: 0    fluconazole (DIFLUCAN) 150 MG tablet, Take 1 tablet by mouth once for 1 dose, Disp: 1 tablet, Rfl: 0    fluticasone (FLONASE) 50 MCG/ACT nasal spray, 2 sprays by Nasal route daily 2 sprays in each nostril daily, Disp: 16 g, Rfl: 3    metoprolol tartrate (LOPRESSOR) 50 MG tablet, Take 1.5 tablets by mouth 2 times daily, Disp: 270 tablet, Rfl: 1    lisinopril (PRINIVIL;ZESTRIL) 10 MG tablet, TAKE ONE TABLET BY MOUTH DAILY, Disp: 90 tablet, Rfl: 1    fluticasone (FLONASE) 50 MCG/ACT nasal spray, 2 sprays by Nasal route daily 2 sprays in each nostril daily, Disp: 16 g, Rfl: 3    mirtazapine (REMERON) 7.5 MG tablet, TAKE ONE TABLET BY MOUTH EVERY NIGHT AS NEEDED FOR SLEEP, Disp: , Rfl:     citalopram (CELEXA) 20 MG tablet, TAKE ONE TABLET BY MOUTH ONCE DAILY, Disp: , Rfl:     ALPRAZolam (XANAX) 0.25 MG tablet, , Disp: , Rfl:     gabapentin (NEURONTIN) 300 MG capsule, TAKE 1 CAPSULE BY MOUTH TWICE DAILY (Patient taking differently: 1 capsule 3 times daily.  TAKE 1 CAPSULE BY MOUTH TWICE DAILY), Disp: 60 capsule, Rfl: 5

## 2023-09-10 LAB
CULTURE: ABNORMAL
SPECIMEN DESCRIPTION: ABNORMAL

## 2023-09-18 DIAGNOSIS — N60.02 CYST OF LEFT BREAST: Primary | ICD-10-CM

## 2023-09-18 NOTE — PROGRESS NOTES
Referral made to Dr. Deana Rubio in [place] Family history: family history includes Cancer in his father and sister; Diabetes in his sister; Heart Disease in his mother; High Cholesterol in his sister; Stroke in his brother and brother. ALLERGIES:  Clopidogrel and Simvastatin  Prior to Admission medications    Medication Sig Start Date End Date Taking? Authorizing Provider   nitroGLYCERIN (NITROSTAT) 0.4 MG SL tablet Place 1 tablet under the tongue every 5 minutes as needed for Chest pain 12/29/20  Yes Ally Phelan MD   atorvastatin (LIPITOR) 40 MG tablet Take 1 tablet by mouth daily 11/25/20  Yes Vangie Stafford MD   SYNTHROID 137 MCG tablet TAKE 1 TABLET BY MOUTH EVERY DAY 5/16/19  Yes Historical Provider, MD   lisinopril (PRINIVIL;ZESTRIL) 10 MG tablet Take 10 mg by mouth daily   Yes Historical Provider, MD   diclofenac (VOLTAREN) 75 MG EC tablet Take 75 mg by mouth 2 times daily  8/4/15  Yes Historical Provider, MD   aspirin EC 81 MG EC tablet Take 1 tablet by mouth daily. 6/23/12  Yes Clarisse Madison MD     Vitals:    12/29/20 1419   BP: 124/82   Site: Left Upper Arm   Position: Sitting   Cuff Size: Large Adult   Pulse: 76   Resp: 16   Temp: 98.1 °F (36.7 °C)   Weight: 232 lb (105.2 kg)   Height: 5' 8\" (1.727 m)      Body mass index is 35.28 kg/m². Wt Readings from Last 3 Encounters:   12/29/20 232 lb (105.2 kg)   06/23/20 230 lb (104.3 kg)   06/25/19 228 lb (103.4 kg)     Constitutional:  Patient is moderately obese pleasant male in no apparent distress. Gained 2 pounds since 6/2020. Eyes:  Left upper lid is retracted. Pupils are equal.  No conjunctival pallor noted. he is wearing face mask. NECK: No JVP or thyromegaly  Cardiovascular: Auscultation: Normal S1 and S2. No murmurs or gallops noted. Carotids are negative for bruits. Abdominal aorta is nonpalpable. No epigastric bruit noted. Peripheral pulses: Pedal pulses 1+ equal in both feet. Benedetta Ou Respiratory:  Respiratory effort is normal. Breath sounds are clear to auscultation. Benedetta Ou Extremities:  No edema, clubbing,  Cyanosis, petechiae. SKIN: Warm and well perfused, no pallor or cyanosis  Abdomen:  No masses or tenderness. No organomegaly noted. Musculoskeletal:  No spinal deformities noted. Gait is normal.  Muscle strength is normal.  Neurologic:  Oriented to time, place, and person and non-anxious. No focal neurological deficit noted. Psychiatric: Normal mood and effect    LAB REVIEW:  CBC:   Lab Results   Component Value Date    WBC 8.96 09/22/2020    HGB 13.9 09/22/2020    HCT 40.8 09/22/2020     09/22/2020     Lipids:   Lab Results   Component Value Date    CHOL 146 09/22/2020    TRIG 170 09/22/2020    HDL 30 (A) 09/22/2020    LDLCALC 82 09/22/2020     Renal:   Lab Results   Component Value Date    BUN 19 09/22/2020    CREATININE 1.00 09/22/2020     09/22/2020    K 4.5 09/22/2020     PT/INR:   Lab Results   Component Value Date    INR 0.9 12/26/2017     Lab Results   Component Value Date    LABA1C 5.9 05/04/2020     IMPRESSION and RECOMMENDATIONS:      Hyperlipidemia  Unable to tolerate higher dose due to side effects and elevated LFT. ASMITA on CPAP  Off of CPAP 3 years ago. Did not help him. S/P CABG x 2, lima-lad svg-cx 6/2012  Stable clinically. Primary/secondary prevention is the goal by aggressive risk modification, healthy and therapeutic life style changes for cardiovascular risk reduction. Various goals are discussed and questions answered. Continue current cardiovascular medications which have been reviewed and discussed individually with you. Counseled for calorie counting, reduction in serving size and exercise and lifestyle modification for weight loss. Counseled for regular exercise. Appropriate prescriptions if needed on this visit are addressed. After visit summery is provided. Questions answered and patient verbalizes understanding. Follow up in 6 months,  sooner if needed.     Corey Dickey MD, 12/29/2020 3:00 PM Please note this report has been partially produced using speech recognition software and may contain errors related to that system including errors in grammar, punctuation, and spelling, as well as words and phrases that may be inappropriate. If there are any questions or concerns please feel free to contact the dictating provider for clarification.

## 2023-09-21 RX ORDER — CEPHALEXIN 500 MG/1
500 CAPSULE ORAL 3 TIMES DAILY
Qty: 21 CAPSULE | Refills: 0 | Status: SHIPPED | OUTPATIENT
Start: 2023-09-21 | End: 2023-09-28

## 2023-09-28 ENCOUNTER — TELEPHONE (OUTPATIENT)
Dept: PRIMARY CARE CLINIC | Age: 38
End: 2023-09-28

## 2023-09-28 RX ORDER — FLUCONAZOLE 150 MG/1
150 TABLET ORAL ONCE
Qty: 1 TABLET | Refills: 0 | Status: SHIPPED | OUTPATIENT
Start: 2023-09-28 | End: 2023-09-28

## 2023-09-28 NOTE — TELEPHONE ENCOUNTER
We received a fax from [address] [place] [address] in regards to patient needing to follow up with with the recommendation for surgical consult. We had previously sent a referral to Dr Jose Duvall with [address] [place], [place] [address] [place], and did not realize that he did not deal with surgeries of the breast. Dr Ramesh Dhaliwal placed a new referral to Dr Murali Martin at [facility] on 09/18. We received another fax from the [facility] today wanting to know what the follow up is going to be. I called and left a message at Dr Ky Pina office asking if they have received the referral and if they are going to follow up with this patient.

## 2023-11-03 ENCOUNTER — TELEPHONE (OUTPATIENT)
Dept: ENT CLINIC | Age: 38
End: 2023-11-03

## 2023-11-03 NOTE — TELEPHONE ENCOUNTER
Patient is asking if she can be fit on the schedule any sooner than 12/1 as she may have conflict that day. Please contact patient to reschedule as no sooner appts are open to offer.

## 2023-11-03 NOTE — TELEPHONE ENCOUNTER
Returned call to patient to advise that we do not have availablity to see her sooner. Patient became immediately aggressive and shouted that she had reached out to our office 3 times without a call back. I advised that as this is the first message I have personally seen, I responded to this one. I advised patient that unfortunately we do not have any sooner appointments, we do not take walk in appointments or same day appointments and that if she is experiencing an emergency she can go to an urgent care or emergency room. Patient sighed heavily and then stated \"I'll just figure it out\" and hung up.   Electronically signed by Shlomo Brian LPN on 94/2/4705 at 8:76 PM

## 2023-12-01 ENCOUNTER — OFFICE VISIT (OUTPATIENT)
Dept: ENT CLINIC | Age: 38
End: 2023-12-01
Payer: MEDICAID

## 2023-12-01 VITALS
SYSTOLIC BLOOD PRESSURE: 125 MMHG | DIASTOLIC BLOOD PRESSURE: 69 MMHG | OXYGEN SATURATION: 96 % | HEART RATE: 84 BPM | WEIGHT: 293 LBS | HEIGHT: 70 IN | BODY MASS INDEX: 41.95 KG/M2 | TEMPERATURE: 97.4 F

## 2023-12-01 DIAGNOSIS — J30.9 ALLERGIC RHINITIS, UNSPECIFIED SEASONALITY, UNSPECIFIED TRIGGER: Primary | ICD-10-CM

## 2023-12-01 DIAGNOSIS — H69.83 ETD (EUSTACHIAN TUBE DYSFUNCTION), BILATERAL: ICD-10-CM

## 2023-12-01 PROCEDURE — G8417 CALC BMI ABV UP PARAM F/U: HCPCS | Performed by: OTOLARYNGOLOGY

## 2023-12-01 PROCEDURE — 4004F PT TOBACCO SCREEN RCVD TLK: CPT | Performed by: OTOLARYNGOLOGY

## 2023-12-01 PROCEDURE — G8427 DOCREV CUR MEDS BY ELIG CLIN: HCPCS | Performed by: OTOLARYNGOLOGY

## 2023-12-01 PROCEDURE — 3078F DIAST BP <80 MM HG: CPT | Performed by: OTOLARYNGOLOGY

## 2023-12-01 PROCEDURE — G8484 FLU IMMUNIZE NO ADMIN: HCPCS | Performed by: OTOLARYNGOLOGY

## 2023-12-01 PROCEDURE — 3074F SYST BP LT 130 MM HG: CPT | Performed by: OTOLARYNGOLOGY

## 2023-12-01 PROCEDURE — 99214 OFFICE O/P EST MOD 30 MIN: CPT | Performed by: OTOLARYNGOLOGY

## 2023-12-01 ASSESSMENT — ENCOUNTER SYMPTOMS
VOMITING: 0
GASTROINTESTINAL NEGATIVE: 1
ABDOMINAL PAIN: 0
SHORTNESS OF BREATH: 0
EYE DISCHARGE: 0
EYE PAIN: 0
APNEA: 0
DIARRHEA: 0
CHEST TIGHTNESS: 0
COLOR CHANGE: 0
EYES NEGATIVE: 1
RESPIRATORY NEGATIVE: 1

## 2023-12-01 NOTE — PROGRESS NOTES
Subjective:      Patient ID:  Juan Torres is a 41 y.o. female. HPI:    Patient presents today for right ear and allergy check. Condition has been present for several years. Has h/o multiple T-tubes on the right. Most recent was placed in March 2023. Reports decreased hearing on the right and it is muffled. Can pop the ear but it hurts. Denies any pain or recent infections. Past Medical History:   Diagnosis Date    Anxiety     Class 3 severe obesity due to excess calories without serious comorbidity with body mass index (BMI) of 50.0 to 59.9 in adult [facility])     Primary hypertension      Past Surgical History:   Procedure Laterality Date    TYMPANOSTOMY TUBE PLACEMENT       Family History   Problem Relation Age of Onset    High Blood Pressure Father     Diabetes Father      Social History     Socioeconomic History    Marital status: Single     Spouse name: None    Number of children: None    Years of education: None    Highest education level: None   Tobacco Use    Smoking status: Every Day     Packs/day: 0.50     Years: 10.00     Additional pack years: 0.00     Total pack years: 5.00     Types: Cigarettes    Smokeless tobacco: Never    Tobacco comments:     trying to quit   Substance and Sexual Activity    Alcohol use: Not Currently    Drug use: Not Currently     Social Determinants of Health     Financial Resource Strain: Low Risk  (7/20/2023)    Overall Financial Resource Strain (CARDIA)     Difficulty of Paying Living Expenses: Not hard at all   Transportation Needs: Unknown (7/20/2023)    PRAPARE - Transportation     Lack of Transportation (Non-Medical): No   Housing Stability: Unknown (7/20/2023)    Housing Stability Vital Sign     Unstable Housing in the Last Year: No     Allergies   Allergen Reactions    No Known Allergies        Review of Systems   Constitutional: Negative. Negative for appetite change, chills and fever. HENT:  Positive for ear pain and hearing loss. Negative for ear discharge.

## 2023-12-15 DIAGNOSIS — L70.0 ACNE VULGARIS: Primary | ICD-10-CM

## 2023-12-28 DIAGNOSIS — I10 ESSENTIAL HYPERTENSION: ICD-10-CM

## 2023-12-28 RX ORDER — LISINOPRIL 10 MG/1
TABLET ORAL
Qty: 90 TABLET | Refills: 1 | Status: SHIPPED | OUTPATIENT
Start: 2023-12-28

## 2023-12-28 NOTE — TELEPHONE ENCOUNTER
Last Appointment:  7/20/2023  Future Appointments   Date Time Provider [address]   1/9/2024  7:45 AM Kody Bermeo DO [facility] E[facility]   1/30/2024 10:00 AM Meredith Walsh

## 2024-01-29 SDOH — HEALTH STABILITY: PHYSICAL HEALTH: ON AVERAGE, HOW MANY DAYS PER WEEK DO YOU ENGAGE IN MODERATE TO STRENUOUS EXERCISE (LIKE A BRISK WALK)?: 0 DAYS

## 2024-01-29 SDOH — HEALTH STABILITY: PHYSICAL HEALTH: ON AVERAGE, HOW MANY MINUTES DO YOU ENGAGE IN EXERCISE AT THIS LEVEL?: 0 MIN

## 2024-01-30 ENCOUNTER — OFFICE VISIT (OUTPATIENT)
Dept: PRIMARY CARE CLINIC | Age: 39
End: 2024-01-30
Payer: MEDICAID

## 2024-01-30 VITALS
OXYGEN SATURATION: 96 % | BODY MASS INDEX: 41.95 KG/M2 | SYSTOLIC BLOOD PRESSURE: 122 MMHG | HEART RATE: 101 BPM | DIASTOLIC BLOOD PRESSURE: 68 MMHG | HEIGHT: 70 IN | TEMPERATURE: 97.8 F | WEIGHT: 293 LBS

## 2024-01-30 DIAGNOSIS — F17.200 TOBACCO USE DISORDER: ICD-10-CM

## 2024-01-30 DIAGNOSIS — R73.9 HYPERGLYCEMIA: ICD-10-CM

## 2024-01-30 DIAGNOSIS — E78.2 MIXED HYPERLIPIDEMIA: ICD-10-CM

## 2024-01-30 DIAGNOSIS — I10 ESSENTIAL HYPERTENSION: ICD-10-CM

## 2024-01-30 DIAGNOSIS — F41.9 ANXIETY: ICD-10-CM

## 2024-01-30 DIAGNOSIS — E66.01 CLASS 3 SEVERE OBESITY DUE TO EXCESS CALORIES WITHOUT SERIOUS COMORBIDITY WITH BODY MASS INDEX (BMI) OF 50.0 TO 59.9 IN ADULT (HCC): ICD-10-CM

## 2024-01-30 DIAGNOSIS — I10 ESSENTIAL HYPERTENSION: Primary | ICD-10-CM

## 2024-01-30 LAB
ABSOLUTE IMMATURE GRANULOCYTE: <0.03 K/UL (ref 0–0.58)
ALBUMIN SERPL-MCNC: 3.7 G/DL (ref 3.5–5.2)
ALP BLD-CCNC: 59 U/L (ref 35–104)
ALT SERPL-CCNC: 16 U/L (ref 0–32)
ANION GAP SERPL CALCULATED.3IONS-SCNC: 13 MMOL/L (ref 7–16)
AST SERPL-CCNC: 16 U/L (ref 0–31)
BASOPHILS ABSOLUTE: 0.03 K/UL (ref 0–0.2)
BASOPHILS RELATIVE PERCENT: 0 % (ref 0–2)
BILIRUB SERPL-MCNC: 0.3 MG/DL (ref 0–1.2)
BUN BLDV-MCNC: 13 MG/DL (ref 6–20)
CALCIUM SERPL-MCNC: 9.3 MG/DL (ref 8.6–10.2)
CHLORIDE BLD-SCNC: 101 MMOL/L (ref 98–107)
CHOLESTEROL: 155 MG/DL
CO2: 24 MMOL/L (ref 22–29)
CREAT SERPL-MCNC: 0.8 MG/DL (ref 0.5–1)
EOSINOPHILS ABSOLUTE: 0.12 K/UL (ref 0.05–0.5)
EOSINOPHILS RELATIVE PERCENT: 2 % (ref 0–6)
GFR SERPL CREATININE-BSD FRML MDRD: >60 ML/MIN/1.73M2
GLUCOSE BLD-MCNC: 95 MG/DL (ref 74–99)
HBA1C MFR BLD: 5.6 % (ref 4–5.6)
HCT VFR BLD CALC: 44.8 % (ref 34–48)
HDLC SERPL-MCNC: 35 MG/DL
HEMOGLOBIN: 14.2 G/DL (ref 11.5–15.5)
IMMATURE GRANULOCYTES: 0 % (ref 0–5)
LDL CHOLESTEROL: 100 MG/DL
LYMPHOCYTES ABSOLUTE: 2.05 K/UL (ref 1.5–4)
LYMPHOCYTES RELATIVE PERCENT: 25 % (ref 20–42)
MCH RBC QN AUTO: 29.3 PG (ref 26–35)
MCHC RBC AUTO-ENTMCNC: 31.7 G/DL (ref 32–34.5)
MCV RBC AUTO: 92.6 FL (ref 80–99.9)
MONOCYTES ABSOLUTE: 0.28 K/UL (ref 0.1–0.95)
MONOCYTES RELATIVE PERCENT: 3 % (ref 2–12)
NEUTROPHILS ABSOLUTE: 5.68 K/UL (ref 1.8–7.3)
NEUTROPHILS RELATIVE PERCENT: 69 % (ref 43–80)
PDW BLD-RTO: 14.6 % (ref 11.5–15)
PLATELET # BLD: 261 K/UL (ref 130–450)
PMV BLD AUTO: 11.6 FL (ref 7–12)
POTASSIUM SERPL-SCNC: 4.7 MMOL/L (ref 3.5–5)
RBC # BLD: 4.84 M/UL (ref 3.5–5.5)
SODIUM BLD-SCNC: 138 MMOL/L (ref 132–146)
TOTAL PROTEIN: 7 G/DL (ref 6.4–8.3)
TRIGL SERPL-MCNC: 102 MG/DL
TSH SERPL DL<=0.05 MIU/L-ACNC: 1.21 UIU/ML (ref 0.27–4.2)
VITAMIN B-12: 276 PG/ML (ref 211–946)
VITAMIN D 25-HYDROXY: 17.7 NG/ML (ref 30–100)
VLDLC SERPL CALC-MCNC: 20 MG/DL
WBC # BLD: 8.2 K/UL (ref 4.5–11.5)

## 2024-01-30 PROCEDURE — 3074F SYST BP LT 130 MM HG: CPT | Performed by: NURSE PRACTITIONER

## 2024-01-30 PROCEDURE — G8482 FLU IMMUNIZE ORDER/ADMIN: HCPCS | Performed by: NURSE PRACTITIONER

## 2024-01-30 PROCEDURE — 99214 OFFICE O/P EST MOD 30 MIN: CPT | Performed by: NURSE PRACTITIONER

## 2024-01-30 PROCEDURE — G8417 CALC BMI ABV UP PARAM F/U: HCPCS | Performed by: NURSE PRACTITIONER

## 2024-01-30 PROCEDURE — G8427 DOCREV CUR MEDS BY ELIG CLIN: HCPCS | Performed by: NURSE PRACTITIONER

## 2024-01-30 PROCEDURE — 3078F DIAST BP <80 MM HG: CPT | Performed by: NURSE PRACTITIONER

## 2024-01-30 PROCEDURE — 4004F PT TOBACCO SCREEN RCVD TLK: CPT | Performed by: NURSE PRACTITIONER

## 2024-01-30 RX ORDER — METOPROLOL TARTRATE 50 MG/1
75 TABLET, FILM COATED ORAL 2 TIMES DAILY
Qty: 270 TABLET | Refills: 1 | Status: SHIPPED | OUTPATIENT
Start: 2024-01-30 | End: 2024-07-28

## 2024-01-30 RX ORDER — LEVONORGESTREL 52 MG/1
1 INTRAUTERINE DEVICE INTRAUTERINE ONCE
COMMUNITY

## 2024-01-30 RX ORDER — TRAZODONE HYDROCHLORIDE 50 MG/1
50 TABLET ORAL NIGHTLY
COMMUNITY
Start: 2024-01-03

## 2024-01-30 ASSESSMENT — PATIENT HEALTH QUESTIONNAIRE - PHQ9
3. TROUBLE FALLING OR STAYING ASLEEP: 1
SUM OF ALL RESPONSES TO PHQ QUESTIONS 1-9: 3
9. THOUGHTS THAT YOU WOULD BE BETTER OFF DEAD, OR OF HURTING YOURSELF: 0
SUM OF ALL RESPONSES TO PHQ QUESTIONS 1-9: 3
SUM OF ALL RESPONSES TO PHQ QUESTIONS 1-9: 3
6. FEELING BAD ABOUT YOURSELF - OR THAT YOU ARE A FAILURE OR HAVE LET YOURSELF OR YOUR FAMILY DOWN: 0
2. FEELING DOWN, DEPRESSED OR HOPELESS: 1
10. IF YOU CHECKED OFF ANY PROBLEMS, HOW DIFFICULT HAVE THESE PROBLEMS MADE IT FOR YOU TO DO YOUR WORK, TAKE CARE OF THINGS AT HOME, OR GET ALONG WITH OTHER PEOPLE: 0
8. MOVING OR SPEAKING SO SLOWLY THAT OTHER PEOPLE COULD HAVE NOTICED. OR THE OPPOSITE, BEING SO FIGETY OR RESTLESS THAT YOU HAVE BEEN MOVING AROUND A LOT MORE THAN USUAL: 0
5. POOR APPETITE OR OVEREATING: 0
SUM OF ALL RESPONSES TO PHQ QUESTIONS 1-9: 3
1. LITTLE INTEREST OR PLEASURE IN DOING THINGS: 0
7. TROUBLE CONCENTRATING ON THINGS, SUCH AS READING THE NEWSPAPER OR WATCHING TELEVISION: 0
SUM OF ALL RESPONSES TO PHQ9 QUESTIONS 1 & 2: 1
4. FEELING TIRED OR HAVING LITTLE ENERGY: 1

## 2024-01-30 ASSESSMENT — ENCOUNTER SYMPTOMS
ROS SKIN COMMENTS: ACNE
GASTROINTESTINAL NEGATIVE: 1

## 2024-01-30 NOTE — PROGRESS NOTES
Subjective  CC: Patient presents to establish.  The patient previously followed with Dr. Villa.    HPI:   I reviewed the patient's past medical history, family history, and social history with her.  She follows with gynecology, Dr. Silveira for cervical cancer screening, she also follows with ENT, Dr. Bermeo and is status post multiple tympanostomy tube placement throughout her life due to recurrent eustachian tube dysfunction. Also sees Ena Vivas at [facility] in [place] for anxiety and medication management, she would like to be referred to psychiatry closer.  She will be establishing with dermatology next week.  She denies any acute complaints today, although would like to discuss possible Ozempic initiation.  She is not diabetic, but her hemoglobin A1c has been in the prediabetes range, so we will monitor this and see if she is possibly a candidate.  Blood pressure is under good control.    ROS:  Review of Systems   Constitutional:         Morbid obesity   HENT:          Eustachian tube dysfunction, chronic.  Multiple tympanostomy tube placement.   Eyes:         Wears glasses   Respiratory:          Tobacco use disorder   Cardiovascular:         Hypertension, hyperlipidemia   Gastrointestinal: Negative.    Musculoskeletal: Negative.    Skin:         Acne   Psychiatric/Behavioral:          Anxiety          Current Outpatient Medications:     sertraline (ZOLOFT) 50 MG tablet, Take 1 tablet by mouth daily, Disp: , Rfl:     traZODone (DESYREL) 50 MG tablet, Take 1 tablet by mouth nightly, Disp: , Rfl:     metoprolol tartrate (LOPRESSOR) 50 MG tablet, Take 1.5 tablets by mouth 2 times daily, Disp: 270 tablet, Rfl: 1    levonorgestrel (LILETTA, 52 MG,) 20.1 MCG/DAY IUD IUD 52 mg, 1 each by IntraUTERine route once, Disp: , Rfl:     lisinopril (PRINIVIL;ZESTRIL) 10 MG tablet, TAKE ONE TABLET BY MOUTH DAILY, Disp: 90 tablet, Rfl: 1    fluticasone (FLONASE) 50 MCG/ACT nasal spray, 2 sprays by Nasal

## 2024-02-02 ENCOUNTER — PATIENT MESSAGE (OUTPATIENT)
Dept: PRIMARY CARE CLINIC | Age: 39
End: 2024-02-02

## 2024-02-02 NOTE — TELEPHONE ENCOUNTER
From: Yasmeen Ayoub  To: Georgette Teran  Sent: 2/2/2024 9:14 AM EST  Subject: available    im available today if someone can call me back regarding my labs. i missed a call yesterday. my phone number [phone] thanks

## 2024-02-13 RX ORDER — FLUCONAZOLE 150 MG/1
150 TABLET ORAL ONCE
Qty: 1 TABLET | Refills: 0 | Status: SHIPPED | OUTPATIENT
Start: 2024-02-13 | End: 2024-02-13

## 2024-02-19 ENCOUNTER — TELEPHONE (OUTPATIENT)
Dept: ENT CLINIC | Age: 39
End: 2024-02-19

## 2024-02-19 NOTE — TELEPHONE ENCOUNTER
1st attempt made to reschedule cancelled appointment. No answer LVM for return call. Electronically signed by Yasmeen Banks on 2/19/2024 at 2:59 PM

## 2024-02-20 NOTE — TELEPHONE ENCOUNTER
2nd attempt made to reschedule cancelled appointment. No answer LVM for return call.Electronically signed by Yasmeen Banks on 2/20/2024 at 12:24 PM

## 2024-03-11 ENCOUNTER — OFFICE VISIT (OUTPATIENT)
Dept: FAMILY MEDICINE CLINIC | Age: 39
End: 2024-03-11
Payer: MEDICAID

## 2024-03-11 VITALS
SYSTOLIC BLOOD PRESSURE: 114 MMHG | TEMPERATURE: 97.5 F | RESPIRATION RATE: 18 BRPM | HEART RATE: 78 BPM | HEIGHT: 70 IN | WEIGHT: 293 LBS | BODY MASS INDEX: 41.95 KG/M2 | OXYGEN SATURATION: 98 % | DIASTOLIC BLOOD PRESSURE: 62 MMHG

## 2024-03-11 DIAGNOSIS — L23.9 ALLERGIC CONTACT DERMATITIS, UNSPECIFIED TRIGGER: Primary | ICD-10-CM

## 2024-03-11 PROCEDURE — 96372 THER/PROPH/DIAG INJ SC/IM: CPT | Performed by: NURSE PRACTITIONER

## 2024-03-11 PROCEDURE — G8427 DOCREV CUR MEDS BY ELIG CLIN: HCPCS | Performed by: NURSE PRACTITIONER

## 2024-03-11 PROCEDURE — 3074F SYST BP LT 130 MM HG: CPT | Performed by: NURSE PRACTITIONER

## 2024-03-11 PROCEDURE — 4004F PT TOBACCO SCREEN RCVD TLK: CPT | Performed by: NURSE PRACTITIONER

## 2024-03-11 PROCEDURE — 3078F DIAST BP <80 MM HG: CPT | Performed by: NURSE PRACTITIONER

## 2024-03-11 PROCEDURE — G8482 FLU IMMUNIZE ORDER/ADMIN: HCPCS | Performed by: NURSE PRACTITIONER

## 2024-03-11 PROCEDURE — G8417 CALC BMI ABV UP PARAM F/U: HCPCS | Performed by: NURSE PRACTITIONER

## 2024-03-11 PROCEDURE — 99213 OFFICE O/P EST LOW 20 MIN: CPT | Performed by: NURSE PRACTITIONER

## 2024-03-11 RX ORDER — METHYLPREDNISOLONE ACETATE 40 MG/ML
40 INJECTION, SUSPENSION INTRA-ARTICULAR; INTRALESIONAL; INTRAMUSCULAR; SOFT TISSUE ONCE
Status: COMPLETED | OUTPATIENT
Start: 2024-03-11 | End: 2024-03-11

## 2024-03-11 RX ORDER — PREDNISONE 10 MG/1
TABLET ORAL
Qty: 18 TABLET | Refills: 0 | Status: SHIPPED | OUTPATIENT
Start: 2024-03-11 | End: 2024-03-19

## 2024-03-11 RX ORDER — FLUVOXAMINE MALEATE 25 MG
25 TABLET ORAL NIGHTLY
COMMUNITY
Start: 2024-02-13

## 2024-03-11 RX ADMIN — METHYLPREDNISOLONE ACETATE 40 MG: 40 INJECTION, SUSPENSION INTRA-ARTICULAR; INTRALESIONAL; INTRAMUSCULAR; SOFT TISSUE at 13:49

## 2024-03-11 NOTE — PROGRESS NOTES
Chief Complaint:   Rash (Back of legs/)      History of Present Illness   Source of history provided by:  patient.     Yasmeen Ayoub is a 38 y.o. old female who [facility], for intermittent episodes of red, raised, and itchy area on  posterior BL lower extremities which began 1 week(s) prior to arrival.  The symptoms were caused by unknown cause.  Since onset the symptoms have been intermittent.   Prior history of similar episodes: No.   Her symptoms are associated with rash and itching and relieved by nothing.  Denies any changes in soaps, deodorants, or detergents.  Denies any close contact to any external known irritant. She denies any hives, welts, difficulty breathing, difficulty swallowing, wheezing, throat tightness, hoarseness, stridor, lightheadedness, dizziness, facial swelling, lip swelling, tongue swelling, fever, chills, chest pain, abd pain, drainage, increased redness, or increased swelling.    ROS   Unless otherwise stated in this report or unable to obtain because of the patient's clinical or mental status as evidenced by the medical record, this patients's positive and negative responses for Review of Systems, constitutional, psych, eyes, ENT, cardiovascular, respiratory, gastrointestinal, neurological, genitourinary, musculoskeletal, integument systems and systems related to the presenting problem are either stated in the preceding or were not pertinent or were negative for the symptoms and/or complaints related to the medical problem.    Past Medical History:  has a past medical history of Anxiety, Class 3 severe obesity due to excess calories without serious comorbidity with body mass index (BMI) of 50.0 to 59.9 in adult (HCC), Eustachian tube dysfunction, right, and Primary hypertension.   Past Surgical History:  has a past surgical history that includes Tympanostomy tube placement; Tonsillectomy; and other surgical history.  Social History:  reports that she has been smoking

## 2024-03-14 ENCOUNTER — TELEPHONE (OUTPATIENT)
Dept: ENT CLINIC | Age: 39
End: 2024-03-14

## 2024-03-14 NOTE — TELEPHONE ENCOUNTER
1st attempt to reschedule pts cancelled appt. Lvm. Electronically signed by Ceci Jefferson on 3/14/2024 at 3:01 PM

## 2024-03-27 NOTE — TELEPHONE ENCOUNTER
Pt is scheduled 5/8 with Dr. Bermeo. Electronically signed by Ceci Jefferson on 3/27/2024 at 12:59 PM

## 2024-03-28 NOTE — PATIENT INSTRUCTIONS
Please let the patient know that insurance is denying Rinvoq & want her to try another injectable.  Please see if she is open to trying Taltz which would be loading doses for a few weeks followed by once every 28 days.  This works very well for psoriatic arthritis.  If so, I will place a new prior authorization for this Return soon for fasting blood work

## 2024-04-01 DIAGNOSIS — I10 ESSENTIAL HYPERTENSION: ICD-10-CM

## 2024-04-02 RX ORDER — METOPROLOL TARTRATE 50 MG/1
75 TABLET, FILM COATED ORAL 2 TIMES DAILY
Qty: 270 TABLET | Refills: 1 | Status: SHIPPED | OUTPATIENT
Start: 2024-04-02 | End: 2024-09-29

## 2024-04-17 ENCOUNTER — OFFICE VISIT (OUTPATIENT)
Dept: FAMILY MEDICINE CLINIC | Age: 39
End: 2024-04-17
Payer: MEDICAID

## 2024-04-17 VITALS
OXYGEN SATURATION: 94 % | TEMPERATURE: 97.8 F | WEIGHT: 293 LBS | RESPIRATION RATE: 18 BRPM | SYSTOLIC BLOOD PRESSURE: 118 MMHG | BODY MASS INDEX: 41.95 KG/M2 | HEART RATE: 75 BPM | DIASTOLIC BLOOD PRESSURE: 72 MMHG | HEIGHT: 70 IN

## 2024-04-17 DIAGNOSIS — L20.9 ATOPIC DERMATITIS, UNSPECIFIED TYPE: Primary | ICD-10-CM

## 2024-04-17 PROCEDURE — 4004F PT TOBACCO SCREEN RCVD TLK: CPT | Performed by: NURSE PRACTITIONER

## 2024-04-17 PROCEDURE — 99213 OFFICE O/P EST LOW 20 MIN: CPT | Performed by: NURSE PRACTITIONER

## 2024-04-17 PROCEDURE — G8417 CALC BMI ABV UP PARAM F/U: HCPCS | Performed by: NURSE PRACTITIONER

## 2024-04-17 PROCEDURE — 3074F SYST BP LT 130 MM HG: CPT | Performed by: NURSE PRACTITIONER

## 2024-04-17 PROCEDURE — 3078F DIAST BP <80 MM HG: CPT | Performed by: NURSE PRACTITIONER

## 2024-04-17 PROCEDURE — G8427 DOCREV CUR MEDS BY ELIG CLIN: HCPCS | Performed by: NURSE PRACTITIONER

## 2024-04-17 RX ORDER — TRIAMCINOLONE ACETONIDE 1 MG/G
CREAM TOPICAL
Qty: 80 G | Refills: 2 | Status: SHIPPED | OUTPATIENT
Start: 2024-04-17

## 2024-04-17 RX ORDER — MINERAL OIL/HYDROPHIL PETROLAT
OINTMENT (GRAM) TOPICAL
Qty: 198 G | Refills: 0 | Status: SHIPPED | OUTPATIENT
Start: 2024-04-17

## 2024-04-17 NOTE — PROGRESS NOTES
Chief Complaint:   Rash (Started a while ago)      History of Present Illness   Source of history provided by:  patient.     Yasmeen Ayoub is a 38 y.o. old female who presents to walk-in care, for intermittent episodes of red, raised, and itchy area on  posterior BL lower extremities which began 6 week(s) prior to arrival.  The symptoms were caused by unknown cause.  Patient was seen in walk-in by myself on 3/11 and was diagnosed with contact dermatitis and was given a prednisone taper.  She took this medication in its entirety and symptoms minimally improved and then worsened once off of the medication.  Since onset the symptoms have been persistent.   Prior history of similar episodes: No.   Her symptoms are associated with rash and itching and relieved by nothing.  Denies any changes in soaps, deodorants, or detergents.  Denies any close contact to any external known irritant. She denies any hives, welts, difficulty breathing, difficulty swallowing, wheezing, throat tightness, hoarseness, stridor, lightheadedness, dizziness, facial swelling, lip swelling, tongue swelling, fever, chills, chest pain, abd pain, drainage, increased redness, or increased swelling.    ROS   Unless otherwise stated in this report or unable to obtain because of the patient's clinical or mental status as evidenced by the medical record, this patients's positive and negative responses for Review of Systems, constitutional, psych, eyes, ENT, cardiovascular, respiratory, gastrointestinal, neurological, genitourinary, musculoskeletal, integument systems and systems related to the presenting problem are either stated in the preceding or were not pertinent or were negative for the symptoms and/or complaints related to the medical problem.    Past Medical History:  has a past medical history of Anxiety, Class 3 severe obesity due to excess calories without serious comorbidity with body mass index (BMI) of 50.0 to 59.9 in adult (HCC),

## 2024-05-28 RX ORDER — AMOXICILLIN AND CLAVULANATE POTASSIUM 875; 125 MG/1; MG/1
1 TABLET, FILM COATED ORAL 2 TIMES DAILY
Qty: 20 TABLET | Refills: 0 | Status: SHIPPED | OUTPATIENT
Start: 2024-05-28 | End: 2024-06-07

## 2024-06-12 DIAGNOSIS — I10 ESSENTIAL HYPERTENSION: ICD-10-CM

## 2024-06-12 RX ORDER — LISINOPRIL 10 MG/1
TABLET ORAL
Qty: 90 TABLET | Refills: 1 | Status: SHIPPED | OUTPATIENT
Start: 2024-06-12

## 2024-06-12 NOTE — TELEPHONE ENCOUNTER
Last Appointment:  Visit date not found  Future Appointments   Date Time Provider Department Center   6/12/2024 10:30 AM Volodymyr Bermeo DO [place] [facility]   6/12/2024 10:45 AM SCHEDULE, EMELY ALMONTE AUDIO [facility]   8/2/2024 10:00 AM Georgette Teran APRN - CNP [facility]

## 2024-07-12 ENCOUNTER — TELEPHONE (OUTPATIENT)
Dept: ENT CLINIC | Age: 39
End: 2024-07-12

## 2024-07-12 NOTE — TELEPHONE ENCOUNTER
Pt cancelled appt due to work. Called pt back to reschedule. Pt said she will call at a later time to r/s.  Electronically signed by Ceci Jefferson on 7/12/2024 at 10:41 AM

## 2024-08-13 DIAGNOSIS — I10 ESSENTIAL HYPERTENSION: ICD-10-CM

## 2024-08-13 RX ORDER — METOPROLOL TARTRATE 50 MG/1
75 TABLET, FILM COATED ORAL 2 TIMES DAILY
Qty: 270 TABLET | Refills: 1 | Status: SHIPPED
Start: 2024-08-13 | End: 2024-08-16 | Stop reason: SDUPTHER

## 2024-08-14 SDOH — ECONOMIC STABILITY: TRANSPORTATION INSECURITY
IN THE PAST 12 MONTHS, HAS LACK OF TRANSPORTATION KEPT YOU FROM MEETINGS, WORK, OR FROM GETTING THINGS NEEDED FOR DAILY LIVING?: NO

## 2024-08-14 SDOH — ECONOMIC STABILITY: FOOD INSECURITY: WITHIN THE PAST 12 MONTHS, THE FOOD YOU BOUGHT JUST DIDN'T LAST AND YOU DIDN'T HAVE MONEY TO GET MORE.: NEVER TRUE

## 2024-08-14 SDOH — ECONOMIC STABILITY: FOOD INSECURITY: WITHIN THE PAST 12 MONTHS, YOU WORRIED THAT YOUR FOOD WOULD RUN OUT BEFORE YOU GOT MONEY TO BUY MORE.: NEVER TRUE

## 2024-08-14 SDOH — ECONOMIC STABILITY: INCOME INSECURITY: HOW HARD IS IT FOR YOU TO PAY FOR THE VERY BASICS LIKE FOOD, HOUSING, MEDICAL CARE, AND HEATING?: NOT HARD AT ALL

## 2024-08-16 ENCOUNTER — OFFICE VISIT (OUTPATIENT)
Dept: PRIMARY CARE CLINIC | Age: 39
End: 2024-08-16
Payer: MEDICAID

## 2024-08-16 VITALS
DIASTOLIC BLOOD PRESSURE: 74 MMHG | OXYGEN SATURATION: 97 % | HEIGHT: 70 IN | RESPIRATION RATE: 18 BRPM | BODY MASS INDEX: 41.95 KG/M2 | TEMPERATURE: 97.6 F | HEART RATE: 85 BPM | SYSTOLIC BLOOD PRESSURE: 118 MMHG | WEIGHT: 293 LBS

## 2024-08-16 DIAGNOSIS — I10 ESSENTIAL HYPERTENSION: ICD-10-CM

## 2024-08-16 DIAGNOSIS — N30.01 ACUTE CYSTITIS WITH HEMATURIA: ICD-10-CM

## 2024-08-16 DIAGNOSIS — R30.0 DYSURIA: Primary | ICD-10-CM

## 2024-08-16 DIAGNOSIS — L30.9 DERMATITIS: ICD-10-CM

## 2024-08-16 LAB
BILIRUBIN, POC: NORMAL
BLOOD URINE, POC: NORMAL
CLARITY, POC: CLEAR
COLOR, POC: NORMAL
CONTROL: NORMAL
GLUCOSE URINE, POC: NORMAL
KETONES, POC: NORMAL
LEUKOCYTE EST, POC: NORMAL
NITRITE, POC: NORMAL
PH, POC: 5.5
PREGNANCY TEST URINE, POC: NORMAL
PROTEIN, POC: NORMAL
SPECIFIC GRAVITY, POC: 1.01
UROBILINOGEN, POC: 0.2

## 2024-08-16 PROCEDURE — 99214 OFFICE O/P EST MOD 30 MIN: CPT | Performed by: NURSE PRACTITIONER

## 2024-08-16 PROCEDURE — 81025 URINE PREGNANCY TEST: CPT | Performed by: NURSE PRACTITIONER

## 2024-08-16 PROCEDURE — 4004F PT TOBACCO SCREEN RCVD TLK: CPT | Performed by: NURSE PRACTITIONER

## 2024-08-16 PROCEDURE — 3074F SYST BP LT 130 MM HG: CPT | Performed by: NURSE PRACTITIONER

## 2024-08-16 PROCEDURE — G8427 DOCREV CUR MEDS BY ELIG CLIN: HCPCS | Performed by: NURSE PRACTITIONER

## 2024-08-16 PROCEDURE — 81002 URINALYSIS NONAUTO W/O SCOPE: CPT | Performed by: NURSE PRACTITIONER

## 2024-08-16 PROCEDURE — 3078F DIAST BP <80 MM HG: CPT | Performed by: NURSE PRACTITIONER

## 2024-08-16 PROCEDURE — G8417 CALC BMI ABV UP PARAM F/U: HCPCS | Performed by: NURSE PRACTITIONER

## 2024-08-16 RX ORDER — HYDROXYZINE HYDROCHLORIDE 25 MG/1
TABLET, FILM COATED ORAL
COMMUNITY
Start: 2024-07-10

## 2024-08-16 RX ORDER — METOPROLOL TARTRATE 50 MG/1
75 TABLET, FILM COATED ORAL 2 TIMES DAILY
Qty: 270 TABLET | Refills: 1 | Status: SHIPPED | OUTPATIENT
Start: 2024-08-16 | End: 2025-02-12

## 2024-08-16 RX ORDER — CHOLECALCIFEROL (VITAMIN D3) 25 MCG
1000 TABLET ORAL DAILY
Qty: 30 TABLET | Refills: 5 | Status: SHIPPED | OUTPATIENT
Start: 2024-08-16

## 2024-08-16 RX ORDER — SULFACETAMIDE SODIUM 100 MG/ML
LOTION TOPICAL
COMMUNITY
Start: 2024-07-10

## 2024-08-16 RX ORDER — LISINOPRIL 10 MG/1
TABLET ORAL
Qty: 90 TABLET | Refills: 1 | Status: SHIPPED | OUTPATIENT
Start: 2024-08-16

## 2024-08-16 RX ORDER — FLUOXETINE HYDROCHLORIDE 20 MG/1
20 CAPSULE ORAL DAILY
COMMUNITY
Start: 2024-07-30

## 2024-08-16 RX ORDER — NITROFURANTOIN 25; 75 MG/1; MG/1
100 CAPSULE ORAL 2 TIMES DAILY
Qty: 14 CAPSULE | Refills: 0 | Status: SHIPPED | OUTPATIENT
Start: 2024-08-16

## 2024-08-16 ASSESSMENT — ENCOUNTER SYMPTOMS
GASTROINTESTINAL NEGATIVE: 1
ROS SKIN COMMENTS: ACNE

## 2024-08-16 NOTE — PROGRESS NOTES
Subjective  CC: Patient presents to follow-up.    HPI:   The patient does continue to follow with psychiatry, medications been adjusted and she feels like this has been held to have a rash to the posterior aspect of her legs and buttock.  She is following with [facility] dermatology, but she does not feel that she is making much progress.  She is taking hydroxyzine and utilizing topical triamcinolone.  This gives her short-term relief but the rash is not improving.  I will refer the patient to Dr. Jang for reevaluation.    At her last office visit, she was noted to be vitamin D deficient, but has not started vitamin D supplementation.  This will be sent to the pharmacy for her.  She reports yesterday she started to have dysuria and polyuria.  She denies any fevers or chills.  Her weight has been stable.  Blood pressure is under good control.    ROS:  Review of Systems   Constitutional:         Morbid obesity   HENT:          Eustachian tube dysfunction, chronic.  Multiple tympanostomy tube placement.   Eyes:         Wears glasses   Respiratory:          Tobacco use disorder   Cardiovascular:         Hypertension, hyperlipidemia   Gastrointestinal: Negative.    Musculoskeletal: Negative.    Skin:         Acne   Psychiatric/Behavioral:          Anxiety          Current Outpatient Medications:     FLUoxetine (PROZAC) 20 MG capsule, Take 1 capsule by mouth daily, Disp: , Rfl:     hydrOXYzine HCl (ATARAX) 25 MG tablet, TAKE ONE TABLET BY MOUTH TWICE DAILY AS NEEDED FOR ITCHING, Disp: , Rfl:     Sulfacetamide Sodium, Acne, 10 % LOTN, APPLY TO THE AFFECTED AREAS OF THE FACE TWICE DAILY UNTIL CLEAR...THEN AS NEEDED, Disp: , Rfl:     lisinopril (PRINIVIL;ZESTRIL) 10 MG tablet, TAKE ONE TABLET BY MOUTH DAILY, Disp: 90 tablet, Rfl: 1    metoprolol tartrate (LOPRESSOR) 50 MG tablet, Take 1.5 tablets by mouth 2 times daily, Disp: 270 tablet, Rfl: 1    vitamin D3 (CHOLECALCIFEROL) 25 MCG (1000 UT) TABS tablet, Take 1 tablet by

## 2024-08-18 LAB
CULTURE: NORMAL
CULTURE: NORMAL
SPECIMEN DESCRIPTION: NORMAL

## 2024-11-11 ENCOUNTER — TELEPHONE (OUTPATIENT)
Dept: OBGYN | Age: 39
End: 2024-11-11

## 2025-02-27 SDOH — ECONOMIC STABILITY: FOOD INSECURITY: WITHIN THE PAST 12 MONTHS, YOU WORRIED THAT YOUR FOOD WOULD RUN OUT BEFORE YOU GOT MONEY TO BUY MORE.: NEVER TRUE

## 2025-02-27 SDOH — ECONOMIC STABILITY: INCOME INSECURITY: IN THE LAST 12 MONTHS, WAS THERE A TIME WHEN YOU WERE NOT ABLE TO PAY THE MORTGAGE OR RENT ON TIME?: NO

## 2025-02-27 SDOH — ECONOMIC STABILITY: FOOD INSECURITY: WITHIN THE PAST 12 MONTHS, THE FOOD YOU BOUGHT JUST DIDN'T LAST AND YOU DIDN'T HAVE MONEY TO GET MORE.: NEVER TRUE

## 2025-02-27 SDOH — ECONOMIC STABILITY: TRANSPORTATION INSECURITY
IN THE PAST 12 MONTHS, HAS THE LACK OF TRANSPORTATION KEPT YOU FROM MEDICAL APPOINTMENTS OR FROM GETTING MEDICATIONS?: NO

## 2025-02-27 ASSESSMENT — PATIENT HEALTH QUESTIONNAIRE - PHQ9
SUM OF ALL RESPONSES TO PHQ QUESTIONS 1-9: 0
3. TROUBLE FALLING OR STAYING ASLEEP: NOT AT ALL
3. TROUBLE FALLING OR STAYING ASLEEP: NOT AT ALL
2. FEELING DOWN, DEPRESSED OR HOPELESS: NOT AT ALL
SUM OF ALL RESPONSES TO PHQ QUESTIONS 1-9: 0
8. MOVING OR SPEAKING SO SLOWLY THAT OTHER PEOPLE COULD HAVE NOTICED. OR THE OPPOSITE - BEING SO FIDGETY OR RESTLESS THAT YOU HAVE BEEN MOVING AROUND A LOT MORE THAN USUAL: NOT AT ALL
4. FEELING TIRED OR HAVING LITTLE ENERGY: NOT AT ALL
1. LITTLE INTEREST OR PLEASURE IN DOING THINGS: NOT AT ALL
SUM OF ALL RESPONSES TO PHQ QUESTIONS 1-9: 0
SUM OF ALL RESPONSES TO PHQ QUESTIONS 1-9: 0
4. FEELING TIRED OR HAVING LITTLE ENERGY: NOT AT ALL
6. FEELING BAD ABOUT YOURSELF - OR THAT YOU ARE A FAILURE OR HAVE LET YOURSELF OR YOUR FAMILY DOWN: NOT AT ALL
10. IF YOU CHECKED OFF ANY PROBLEMS, HOW DIFFICULT HAVE THESE PROBLEMS MADE IT FOR YOU TO DO YOUR WORK, TAKE CARE OF THINGS AT HOME, OR GET ALONG WITH OTHER PEOPLE: NOT DIFFICULT AT ALL
2. FEELING DOWN, DEPRESSED OR HOPELESS: NOT AT ALL
7. TROUBLE CONCENTRATING ON THINGS, SUCH AS READING THE NEWSPAPER OR WATCHING TELEVISION: NOT AT ALL
10. IF YOU CHECKED OFF ANY PROBLEMS, HOW DIFFICULT HAVE THESE PROBLEMS MADE IT FOR YOU TO DO YOUR WORK, TAKE CARE OF THINGS AT HOME, OR GET ALONG WITH OTHER PEOPLE: NOT DIFFICULT AT ALL
9. THOUGHTS THAT YOU WOULD BE BETTER OFF DEAD, OR OF HURTING YOURSELF: NOT AT ALL
9. THOUGHTS THAT YOU WOULD BE BETTER OFF DEAD, OR OF HURTING YOURSELF: NOT AT ALL
1. LITTLE INTEREST OR PLEASURE IN DOING THINGS: NOT AT ALL
5. POOR APPETITE OR OVEREATING: NOT AT ALL
SUM OF ALL RESPONSES TO PHQ QUESTIONS 1-9: 0
SUM OF ALL RESPONSES TO PHQ9 QUESTIONS 1 & 2: 0
8. MOVING OR SPEAKING SO SLOWLY THAT OTHER PEOPLE COULD HAVE NOTICED. OR THE OPPOSITE, BEING SO FIGETY OR RESTLESS THAT YOU HAVE BEEN MOVING AROUND A LOT MORE THAN USUAL: NOT AT ALL
7. TROUBLE CONCENTRATING ON THINGS, SUCH AS READING THE NEWSPAPER OR WATCHING TELEVISION: NOT AT ALL
5. POOR APPETITE OR OVEREATING: NOT AT ALL
6. FEELING BAD ABOUT YOURSELF - OR THAT YOU ARE A FAILURE OR HAVE LET YOURSELF OR YOUR FAMILY DOWN: NOT AT ALL

## 2025-02-28 ENCOUNTER — OFFICE VISIT (OUTPATIENT)
Dept: PRIMARY CARE CLINIC | Age: 40
End: 2025-02-28
Payer: MEDICAID

## 2025-02-28 VITALS
WEIGHT: 293 LBS | SYSTOLIC BLOOD PRESSURE: 126 MMHG | DIASTOLIC BLOOD PRESSURE: 84 MMHG | HEART RATE: 91 BPM | HEIGHT: 70 IN | OXYGEN SATURATION: 97 % | TEMPERATURE: 97.1 F | RESPIRATION RATE: 20 BRPM | BODY MASS INDEX: 41.95 KG/M2

## 2025-02-28 DIAGNOSIS — E78.2 MIXED HYPERLIPIDEMIA: ICD-10-CM

## 2025-02-28 DIAGNOSIS — E55.9 VITAMIN D DEFICIENCY: ICD-10-CM

## 2025-02-28 DIAGNOSIS — R60.0 EDEMA OF BOTH LOWER EXTREMITIES: Primary | ICD-10-CM

## 2025-02-28 DIAGNOSIS — I87.2 STASIS DERMATITIS OF BOTH LEGS: ICD-10-CM

## 2025-02-28 DIAGNOSIS — E66.01 CLASS 3 SEVERE OBESITY DUE TO EXCESS CALORIES WITHOUT SERIOUS COMORBIDITY WITH BODY MASS INDEX (BMI) OF 50.0 TO 59.9 IN ADULT: ICD-10-CM

## 2025-02-28 DIAGNOSIS — I10 ESSENTIAL HYPERTENSION: ICD-10-CM

## 2025-02-28 DIAGNOSIS — E66.813 CLASS 3 SEVERE OBESITY DUE TO EXCESS CALORIES WITHOUT SERIOUS COMORBIDITY WITH BODY MASS INDEX (BMI) OF 50.0 TO 59.9 IN ADULT: ICD-10-CM

## 2025-02-28 LAB
ALBUMIN: 3.9 G/DL (ref 3.5–5.2)
ALP BLD-CCNC: 48 U/L (ref 35–104)
ALT SERPL-CCNC: 21 U/L (ref 0–32)
ANION GAP SERPL CALCULATED.3IONS-SCNC: 10 MMOL/L (ref 7–16)
AST SERPL-CCNC: 19 U/L (ref 0–31)
BASOPHILS ABSOLUTE: 0.03 K/UL (ref 0–0.2)
BASOPHILS RELATIVE PERCENT: 0 % (ref 0–2)
BILIRUB SERPL-MCNC: 0.4 MG/DL (ref 0–1.2)
BUN BLDV-MCNC: 12 MG/DL (ref 6–20)
CALCIUM SERPL-MCNC: 9 MG/DL (ref 8.6–10.2)
CHLORIDE BLD-SCNC: 103 MMOL/L (ref 98–107)
CHOLESTEROL, TOTAL: 154 MG/DL
CO2: 24 MMOL/L (ref 22–29)
CREAT SERPL-MCNC: 0.7 MG/DL (ref 0.5–1)
EOSINOPHILS ABSOLUTE: 0.18 K/UL (ref 0.05–0.5)
EOSINOPHILS RELATIVE PERCENT: 2 % (ref 0–6)
GFR, ESTIMATED: >90 ML/MIN/1.73M2
GLUCOSE BLD-MCNC: 103 MG/DL (ref 74–99)
HCT VFR BLD CALC: 42.5 % (ref 34–48)
HDLC SERPL-MCNC: 30 MG/DL
HEMOGLOBIN: 13.7 G/DL (ref 11.5–15.5)
IMMATURE GRANULOCYTES %: 0 % (ref 0–5)
IMMATURE GRANULOCYTES ABSOLUTE: 0.03 K/UL (ref 0–0.58)
LDL CHOLESTEROL: 99 MG/DL
LYMPHOCYTES ABSOLUTE: 2.61 K/UL (ref 1.5–4)
LYMPHOCYTES RELATIVE PERCENT: 32 % (ref 20–42)
MCH RBC QN AUTO: 30 PG (ref 26–35)
MCHC RBC AUTO-ENTMCNC: 32.2 G/DL (ref 32–34.5)
MCV RBC AUTO: 93.2 FL (ref 80–99.9)
MONOCYTES ABSOLUTE: 0.32 K/UL (ref 0.1–0.95)
MONOCYTES RELATIVE PERCENT: 4 % (ref 2–12)
NEUTROPHILS ABSOLUTE: 5.08 K/UL (ref 1.8–7.3)
NEUTROPHILS RELATIVE PERCENT: 62 % (ref 43–80)
PDW BLD-RTO: 13.1 % (ref 11.5–15)
PLATELET # BLD: 246 K/UL (ref 130–450)
PMV BLD AUTO: 12 FL (ref 7–12)
POTASSIUM SERPL-SCNC: 4.4 MMOL/L (ref 3.5–5)
RBC # BLD: 4.56 M/UL (ref 3.5–5.5)
SODIUM BLD-SCNC: 137 MMOL/L (ref 132–146)
TOTAL PROTEIN: 7.1 G/DL (ref 6.4–8.3)
TRIGL SERPL-MCNC: 127 MG/DL
TSH SERPL DL<=0.05 MIU/L-ACNC: 1.54 UIU/ML (ref 0.27–4.2)
VITAMIN D 25-HYDROXY: 13.4 NG/ML (ref 30–100)
VLDLC SERPL CALC-MCNC: 25 MG/DL
WBC # BLD: 8.3 K/UL (ref 4.5–11.5)

## 2025-02-28 PROCEDURE — 3074F SYST BP LT 130 MM HG: CPT | Performed by: NURSE PRACTITIONER

## 2025-02-28 PROCEDURE — 99214 OFFICE O/P EST MOD 30 MIN: CPT | Performed by: NURSE PRACTITIONER

## 2025-02-28 PROCEDURE — 3079F DIAST BP 80-89 MM HG: CPT | Performed by: NURSE PRACTITIONER

## 2025-02-28 PROCEDURE — 4004F PT TOBACCO SCREEN RCVD TLK: CPT | Performed by: NURSE PRACTITIONER

## 2025-02-28 PROCEDURE — G8427 DOCREV CUR MEDS BY ELIG CLIN: HCPCS | Performed by: NURSE PRACTITIONER

## 2025-02-28 PROCEDURE — G8417 CALC BMI ABV UP PARAM F/U: HCPCS | Performed by: NURSE PRACTITIONER

## 2025-02-28 PROCEDURE — G2211 COMPLEX E/M VISIT ADD ON: HCPCS | Performed by: NURSE PRACTITIONER

## 2025-02-28 RX ORDER — GABAPENTIN 300 MG/1
300 CAPSULE ORAL 3 TIMES DAILY
COMMUNITY
Start: 2025-01-31

## 2025-02-28 RX ORDER — CHOLECALCIFEROL (VITAMIN D3) 25 MCG
1000 TABLET ORAL DAILY
Qty: 30 TABLET | Refills: 5 | Status: SHIPPED | OUTPATIENT
Start: 2025-02-28

## 2025-02-28 RX ORDER — METOPROLOL TARTRATE 50 MG
75 TABLET ORAL 2 TIMES DAILY
Qty: 270 TABLET | Refills: 1 | Status: SHIPPED | OUTPATIENT
Start: 2025-02-28 | End: 2025-08-27

## 2025-02-28 RX ORDER — LISINOPRIL 10 MG/1
TABLET ORAL
Qty: 90 TABLET | Refills: 1 | Status: SHIPPED | OUTPATIENT
Start: 2025-02-28

## 2025-02-28 ASSESSMENT — ENCOUNTER SYMPTOMS
ROS SKIN COMMENTS: ACNE
GASTROINTESTINAL NEGATIVE: 1

## 2025-02-28 NOTE — PROGRESS NOTES
Subjective  CC: Patient presents to follow-up.    HPI:   The patient is here today for follow-up.  She has not been following with dermatology recently, she continues to have itching and pruritus to the posterior aspect of the distal lower extremities.  She has been using topical treatment, the patient does have significant varicose veins, and I discussed with her the possibility of venous stasis contributing to some of her symptoms.  I believe that it would be worth a trial of compression hose at this point in time.    Blood pressure is under good control.  She is due for lab work today.    ROS:  Review of Systems   Constitutional:         Morbid obesity   HENT:          Eustachian tube dysfunction, chronic.  Multiple tympanostomy tube placement.   Eyes:         Wears glasses   Respiratory:          Tobacco use disorder   Cardiovascular:         Hypertension, hyperlipidemia   Gastrointestinal: Negative.    Musculoskeletal: Negative.    Skin:         Acne   Psychiatric/Behavioral:          Anxiety          Current Outpatient Medications:     gabapentin (NEURONTIN) 300 MG capsule, 1 capsule 3 times daily. TAKE 1 CAPSULE BY MOUTH IN THE MORNING AND 2 CAPSULES IN THE EVENING FOR ANXIETY, Disp: , Rfl:     metoprolol tartrate (LOPRESSOR) 50 MG tablet, Take 1.5 tablets by mouth 2 times daily, Disp: 270 tablet, Rfl: 1    lisinopril (PRINIVIL;ZESTRIL) 10 MG tablet, TAKE ONE TABLET BY MOUTH DAILY, Disp: 90 tablet, Rfl: 1    vitamin D3 (CHOLECALCIFEROL) 25 MCG (1000 UT) TABS tablet, Take 1 tablet by mouth daily, Disp: 30 tablet, Rfl: 5    SLYND 4 MG TABS, Take 1 tablet by mouth daily, Disp: 28 tablet, Rfl: 3    FLUoxetine (PROZAC) 20 MG capsule, Take 1 capsule by mouth daily, Disp: , Rfl:     hydrOXYzine HCl (ATARAX) 25 MG tablet, TAKE ONE TABLET BY MOUTH TWICE DAILY AS NEEDED FOR ITCHING, Disp: , Rfl:     traZODone (DESYREL) 50 MG tablet, Take 1 tablet by mouth nightly, Disp: , Rfl:     levonorgestrel (LILETTA, 52 MG,) 20.1

## 2025-03-04 DIAGNOSIS — E55.9 VITAMIN D DEFICIENCY: ICD-10-CM

## 2025-03-04 RX ORDER — CHOLECALCIFEROL (VITAMIN D3) 25 MCG
2000 TABLET ORAL DAILY
Qty: 60 TABLET | Refills: 5 | Status: SHIPPED | OUTPATIENT
Start: 2025-03-04

## 2025-03-13 DIAGNOSIS — I87.2 STASIS DERMATITIS OF BOTH LEGS: Primary | ICD-10-CM

## 2025-08-20 DIAGNOSIS — I10 ESSENTIAL HYPERTENSION: ICD-10-CM

## 2025-08-20 RX ORDER — METOPROLOL TARTRATE 50 MG
75 TABLET ORAL 2 TIMES DAILY
Qty: 270 TABLET | Refills: 1 | Status: SHIPPED | OUTPATIENT
Start: 2025-08-20 | End: 2026-02-16

## 2025-09-05 ENCOUNTER — OFFICE VISIT (OUTPATIENT)
Dept: PRIMARY CARE CLINIC | Age: 40
End: 2025-09-05
Payer: MEDICAID

## 2025-09-05 VITALS
HEIGHT: 70 IN | SYSTOLIC BLOOD PRESSURE: 128 MMHG | DIASTOLIC BLOOD PRESSURE: 86 MMHG | HEART RATE: 91 BPM | TEMPERATURE: 97.8 F | OXYGEN SATURATION: 97 % | WEIGHT: 293 LBS | RESPIRATION RATE: 18 BRPM | BODY MASS INDEX: 41.95 KG/M2

## 2025-09-05 DIAGNOSIS — R73.9 HYPERGLYCEMIA: ICD-10-CM

## 2025-09-05 DIAGNOSIS — I10 ESSENTIAL HYPERTENSION: ICD-10-CM

## 2025-09-05 DIAGNOSIS — F17.200 TOBACCO USE DISORDER: ICD-10-CM

## 2025-09-05 DIAGNOSIS — E78.2 MIXED HYPERLIPIDEMIA: Primary | ICD-10-CM

## 2025-09-05 DIAGNOSIS — E55.9 VITAMIN D DEFICIENCY: ICD-10-CM

## 2025-09-05 DIAGNOSIS — E66.813 CLASS 3 SEVERE OBESITY DUE TO EXCESS CALORIES WITHOUT SERIOUS COMORBIDITY WITH BODY MASS INDEX (BMI) OF 50.0 TO 59.9 IN ADULT (HCC): ICD-10-CM

## 2025-09-05 PROCEDURE — 3079F DIAST BP 80-89 MM HG: CPT | Performed by: NURSE PRACTITIONER

## 2025-09-05 PROCEDURE — 4004F PT TOBACCO SCREEN RCVD TLK: CPT | Performed by: NURSE PRACTITIONER

## 2025-09-05 PROCEDURE — G8427 DOCREV CUR MEDS BY ELIG CLIN: HCPCS | Performed by: NURSE PRACTITIONER

## 2025-09-05 PROCEDURE — G8417 CALC BMI ABV UP PARAM F/U: HCPCS | Performed by: NURSE PRACTITIONER

## 2025-09-05 PROCEDURE — 99214 OFFICE O/P EST MOD 30 MIN: CPT | Performed by: NURSE PRACTITIONER

## 2025-09-05 PROCEDURE — 3074F SYST BP LT 130 MM HG: CPT | Performed by: NURSE PRACTITIONER

## 2025-09-05 RX ORDER — LISINOPRIL 10 MG/1
TABLET ORAL
Qty: 90 TABLET | Refills: 1 | Status: SHIPPED | OUTPATIENT
Start: 2025-09-05

## 2025-09-05 RX ORDER — BUPROPION HYDROCHLORIDE 150 MG/1
TABLET, EXTENDED RELEASE ORAL
Qty: 60 TABLET | Refills: 3 | Status: SHIPPED | OUTPATIENT
Start: 2025-09-05

## 2025-09-05 ASSESSMENT — ENCOUNTER SYMPTOMS
GASTROINTESTINAL NEGATIVE: 1
ROS SKIN COMMENTS: ACNE